# Patient Record
Sex: FEMALE | Race: BLACK OR AFRICAN AMERICAN | Employment: OTHER | URBAN - METROPOLITAN AREA
[De-identification: names, ages, dates, MRNs, and addresses within clinical notes are randomized per-mention and may not be internally consistent; named-entity substitution may affect disease eponyms.]

---

## 2017-01-18 ENCOUNTER — HOSPITAL ENCOUNTER (OUTPATIENT)
Dept: MAMMOGRAPHY | Age: 82
Discharge: HOME | End: 2017-01-18
Attending: FAMILY MEDICINE
Payer: MEDICARE

## 2017-01-18 DIAGNOSIS — Z12.31 VISIT FOR SCREENING MAMMOGRAM: ICD-10-CM

## 2017-01-18 PROCEDURE — 77067 SCR MAMMO BI INCL CAD: CPT

## 2017-03-10 ENCOUNTER — OFFICE VISIT (OUTPATIENT)
Dept: INTERNAL MEDICINE CLINIC | Age: 82
End: 2017-03-10

## 2017-03-10 VITALS
DIASTOLIC BLOOD PRESSURE: 60 MMHG | TEMPERATURE: 97.8 F | HEIGHT: 67 IN | BODY MASS INDEX: 25.39 KG/M2 | OXYGEN SATURATION: 100 % | SYSTOLIC BLOOD PRESSURE: 148 MMHG | WEIGHT: 161.8 LBS | HEART RATE: 53 BPM | RESPIRATION RATE: 16 BRPM

## 2017-03-10 DIAGNOSIS — I10 ESSENTIAL HYPERTENSION: Primary | ICD-10-CM

## 2017-03-10 DIAGNOSIS — R41.3 MEMORY CHANGES: ICD-10-CM

## 2017-03-10 DIAGNOSIS — R61 NIGHT SWEATS: ICD-10-CM

## 2017-03-10 DIAGNOSIS — G47.00 INSOMNIA, UNSPECIFIED TYPE: ICD-10-CM

## 2017-03-10 RX ORDER — ZOLPIDEM TARTRATE 5 MG/1
TABLET ORAL
Qty: 30 TAB | Refills: 1 | Status: SHIPPED | OUTPATIENT
Start: 2017-03-10 | End: 2017-07-12 | Stop reason: SDUPTHER

## 2017-03-10 NOTE — PROGRESS NOTES
SUBJECTIVE:   Ms. Rajat Norman is a 80 y.o. female who is here for follow up of htn. Pt's BP is slightly elevated at 151/92 and 148/60 upon recheck today. Pt reports her BP is normal at home. Pt endorses taking htn medication as prescribed. Pt denies cp, SOB, or BLE edema. Pt c/o insomnia. Pt denies napping or drinking caffeine. Pt reports trying OTC sleep aids without relief. Pt called her son Sonya Chawla in the office today. Pt's son states he is concerned about some memory loss that is progressing. Pt claims she is fine not remembering things and is not concerned. Pt claims her son is more concerned than she is. Pt notes she may slip any medications she gets prescribed for memory into his food. Pt reports using Estroven for almost complete relief of hot flashes. Pt endorses walking for exercise. At this time, she is otherwise doing well and has brought no other complaints to my attention today. For a list of the medical issues addressed today, see the assessment and plan below. PMH:   Past Medical History:   Diagnosis Date    Arthritis     inflammatory Evaline Lever); rheumatoid?  Bladder incontinence 1997    Contact dermatitis and other eczema, due to unspecified cause     rosacea; chronic urticaria; Dr. Latrice Koenig Hypertension     Neuropathy, peripheral ([facility] [address]     Perennial allergic rhinitis     chronic sinusitis     PSH:  has a past surgical history that includes urological; foot/toes surgery proc unlisted (1/2001); drea and bso (1970's); endoscopy, colon, diagnostic (2001); breast biopsy (Left, long ago); and hansa us bx breast lt vac asst (Left, 01/20/2016). All: is allergic to nsaids (non-steroidal anti-inflammatory drug). MEDS:   Current Outpatient Prescriptions   Medication Sig    zolpidem (AMBIEN) 5 mg tablet Take 1/2 tablet every evening.     amLODIPine (NORVASC) 2.5 mg tablet TAKE 1 TABLET BY MOUTH DAILY    atenolol-chlorthalidone (TENORETIC) 50-25 mg per tablet TAKE 1 TABLET BY MOUTH ONCE EVERY DAY    aspirin delayed-release 81 mg tablet Take 81 mg by mouth daily.  MULTIVIT &MINERALS/FERROUS FUM (MULTI VITAMIN PO) Take  by mouth. No current facility-administered medications for this visit. FH: family history includes Breast Cancer in her mother; Cancer in her sister; Cancer (age of onset: 79) in her mother; Diabetes in her brother; Other in her father. SH:  reports that she has never smoked. She has never used smokeless tobacco. She reports that she does not drink alcohol or use illicit drugs. Review of Systems - History obtained from the patient  General ROS: +changes in memory, +insomnia, otherwise negative  Psychological ROS: negative  Ophthalmic ROS: negative  ENT ROS: negative  Respiratory ROS: no cough, shortness of breath, or wheezing  Cardiovascular ROS: no chest pain or dyspnea on exertion  Gastrointestinal ROS: no abdominal pain, change in bowel habits, or black or bloody stools  Genito-Urinary ROS: negative  Musculoskeletal ROS: negative  Neurological ROS: negative  Dermatological ROS: negative    OBJECTIVE:   Vitals:   Visit Vitals    /60    Pulse (!) 53    Temp 97.8 °F (36.6 °C) (Oral)    Resp 16    Ht 5' 6.5\" (1.689 m)    Wt 161 lb 12.8 oz (73.4 kg)    LMP  (LMP Unknown)    SpO2 100%    BMI 25.72 kg/m2      Gen: Pleasant 80 y.o.  female in NAD. HEENT: NC/AT. HEART: RRR, No M/G/R. LUNGS: CTAB No W/R. EXTREMITIES: Warm. No C/C/E. NEURO: Alert and oriented x 3. Cranial nerves grossly intact. No focal sensory or motor deficits noted. SKIN: Warm. Dry. No rashes or other lesions noted. ASSESSMENT/ PLAN:     Belinda Reid was seen today for hypertension. Diagnoses and all orders for this visit:    Essential hypertension  -     METABOLIC PANEL, COMPREHENSIVE    Night sweats    Memory changes  -     REFERRAL TO NEUROPSYCHOLOGY    Insomnia, unspecified type  -     zolpidem (AMBIEN) 5 mg tablet;  Take 1/2 tablet every evening. This patient's blood pressure is stable and controlled on the current medication. Continue the current regimen. Patient has white coat hypertension. Pt was referred to Dr. Fiona Mallory (neuropsychology) for changes in memory. I recommended the pt learn a new language or new tasks to keep her mind sharp. I ordered a CMP lab for monitoring of medication. Pt may have this lab done another day. I prescribed Ambien 5mg 1/2 tablet nightly for management of insomnia. Pt will f/u in 6 months for htn. Follow-up Disposition:  Return in about 6 months (around 9/10/2017) for follow up htn. I have reviewed the patient's medications and risks/side effects/benefits were discussed. Diagnosis(-es) explained to patient and questions answered. Literature provided where appropriate.      Written by Xavier Frias, as dictated by Alanna Peng MD.

## 2017-03-10 NOTE — PROGRESS NOTES
Reviewed record in preparation for visit and have obtained necessary documentation. Identified pt with two pt identifiers(name and ). Chief Complaint   Patient presents with    Hypertension     pt is here for a f/u       Health Maintenance Due   Topic Date Due    ZOSTER VACCINE AGE 60>  1995    Pneumococcal 65+ Low/Medium Risk (2 of 2 - PPSV23) 2011    DTaP/Tdap/Td series (1 - Tdap) 2011    MEDICARE YEARLY EXAM  04/15/2016    INFLUENZA AGE 9 TO ADULT  2016       Coordination of Care Questionnaire:  :     1. Have you been to the ER, urgent care clinic since your last visit? Hospitalized since your last visit?no    2. Have you seen or consulted any other health care providers outside of the [address] [place] [address] since your last visit? Include any pap smears or colon screening.  no

## 2017-03-10 NOTE — MR AVS SNAPSHOT
Visit Information Date & Time Provider Department Dept. Phone Encounter #  
 3/10/2017 11:30 AM Will Galicia, [address] [id number] Follow-up Instructions Return in about 6 months (around 9/10/2017) for follow up htn. Upcoming Health Maintenance Date Due ZOSTER VACCINE AGE 60> 4/4/1995 Pneumococcal 65+ Low/Medium Risk (2 of 2 - PPSV23) 6/1/2011 DTaP/Tdap/Td series (1 - Tdap) 7/2/2011 MEDICARE YEARLY EXAM 4/15/2016 INFLUENZA AGE 9 TO ADULT 8/1/2016 GLAUCOMA SCREENING Q2Y 2/10/2018 Allergies as of 3/10/2017  Review Complete On: 3/10/2017 By: Will Galicia MD  
  
 Severity Noted Reaction Type Reactions Nsaids (Non-steroidal Anti-inflammatory Drug)  11/10/2011    Unknown (comments) GI upset Current Immunizations  Reviewed on 9/18/2013 Name Date Influenza High Dose Vaccine PF 11/15/2016 Influenza Vaccine 10/14/2015 10:41 AM, 10/1/2014, 11/1/2013 Influenza Vaccine Split 9/11/2012 Influenza Vaccine Whole 12/14/2011 Pneumococcal Vaccine (Unspecified Type) 6/1/2006 TD Vaccine 7/1/2011 Not reviewed this visit You Were Diagnosed With   
  
 Codes Comments Essential hypertension    -  Primary ICD-10-CM: I10 
ICD-9-CM: 401.9 Night sweats     ICD-10-CM: R61 
ICD-9-CM: 780.8 Memory changes     ICD-10-CM: R41.3 ICD-9-CM: 780.93 Insomnia, unspecified type     ICD-10-CM: G47.00 ICD-9-CM: 780.52 Vitals BP Pulse Temp Resp Height(growth percentile) Weight(growth percentile) 148/60 (!) 53 97.8 °F (36.6 °C) (Oral) 16 5' 6.5\" (1.689 m) 161 lb 12.8 oz (73.4 kg) LMP SpO2 BMI OB Status Smoking Status (LMP Unknown) 100% 25.72 kg/m2 Hysterectomy Never Smoker Vitals History BMI and BSA Data Body Mass Index Body Surface Area 25.72 kg/m 2 1.86 m 2 Preferred Pharmacy Pharmacy Name Phone  [address] [phone] Your Updated Medication List  
  
   
This list is accurate as of: 3/10/17 12:43 PM.  Always use your most recent med list. amLODIPine 2.5 mg tablet Commonly known as:  Crawford Mend TAKE 1 TABLET BY MOUTH DAILY  
  
 aspirin delayed-release 81 mg tablet Take 81 mg by mouth daily. atenolol-chlorthalidone 50-25 mg per tablet Commonly known as:  TENORETIC  
TAKE 1 TABLET BY MOUTH ONCE EVERY DAY  
  
 MULTI VITAMIN PO Take  by mouth.  
  
 zolpidem 5 mg tablet Commonly known as:  AMBIEN Take 1/2 tablet every evening. Prescriptions Printed Refills  
 zolpidem (AMBIEN) 5 mg tablet 1 Sig: Take 1/2 tablet every evening. Class: Print We Performed the Following METABOLIC PANEL, COMPREHENSIVE [90936 CPT(R)] REFERRAL TO NEUROPSYCHOLOGY [ZGP54 Custom] Comments:  
 Please evaluate patient for memory changes. Follow-up Instructions Return in about 6 months (around 9/10/2017) for follow up htn. Referral Information Referral ID Referred By Referred To  
  
 [id number] [place], [address] [facility] [id number] [place], [address], [facility] Visits Status Start Date End Date 1 New Request 3/10/17 3/10/18 If your referral has a status of pending review or denied, additional information will be sent to support the outcome of this decision. Introducing [facility]! Dear Claudia Bryant: Thank you for requesting a Atzip account. Our records indicate that you already have an active Atzip account. You can access your account anytime at [email or web address] Did you know that you can access your hospital and ER discharge instructions at any time in Atzip? You can also review all of your test results from your hospital stay or ER visit. Additional Information If you have questions, please visit the Frequently Asked Questions section of the OnShift website at [email or web address] Remember, OnShift is NOT to be used for urgent needs. For medical emergencies, dial 911. Now available from your iPhone and Android! Please provide this summary of care documentation to your next provider. Your primary care clinician is listed as Alanna Peng. If you have any questions after today's visit, please call [phone].

## 2017-03-11 LAB
ALBUMIN SERPL-MCNC: 4.2 G/DL (ref 3.5–4.7)
ALBUMIN/GLOB SERPL: 1.4 {RATIO} (ref 1.1–2.5)
ALP SERPL-CCNC: 43 IU/L (ref 39–117)
ALT SERPL-CCNC: 12 IU/L (ref 0–32)
AST SERPL-CCNC: 18 IU/L (ref 0–40)
BILIRUB SERPL-MCNC: 0.4 MG/DL (ref 0–1.2)
BUN SERPL-MCNC: 12 MG/DL (ref 8–27)
BUN/CREAT SERPL: 23 (ref 11–26)
CALCIUM SERPL-MCNC: 10.3 MG/DL (ref 8.7–10.3)
CHLORIDE SERPL-SCNC: 98 MMOL/L (ref 96–106)
CO2 SERPL-SCNC: 29 MMOL/L (ref 18–29)
CREAT SERPL-MCNC: 0.53 MG/DL (ref 0.57–1)
GLOBULIN SER CALC-MCNC: 3.1 G/DL (ref 1.5–4.5)
GLUCOSE SERPL-MCNC: 88 MG/DL (ref 65–99)
POTASSIUM SERPL-SCNC: 4.1 MMOL/L (ref 3.5–5.2)
PROT SERPL-MCNC: 7.3 G/DL (ref 6–8.5)
SODIUM SERPL-SCNC: 142 MMOL/L (ref 134–144)

## 2017-05-09 DIAGNOSIS — I10 ESSENTIAL HYPERTENSION: ICD-10-CM

## 2017-05-10 RX ORDER — AMLODIPINE BESYLATE 2.5 MG/1
TABLET ORAL
Qty: 90 TAB | Refills: 3 | Status: SHIPPED | OUTPATIENT
Start: 2017-05-10 | End: 2018-04-10 | Stop reason: SDUPTHER

## 2017-05-10 RX ORDER — ATENOLOL AND CHLORTHALIDONE TABLET 50; 25 MG/1; MG/1
TABLET ORAL
Qty: 90 TAB | Refills: 3 | Status: SHIPPED | OUTPATIENT
Start: 2017-05-10 | End: 2018-04-30 | Stop reason: SDUPTHER

## 2017-07-12 DIAGNOSIS — G47.00 INSOMNIA, UNSPECIFIED TYPE: ICD-10-CM

## 2017-07-12 RX ORDER — ZOLPIDEM TARTRATE 5 MG/1
TABLET ORAL
Qty: 30 TAB | Refills: 1 | Status: SHIPPED | OUTPATIENT
Start: 2017-07-12 | End: 2018-04-11

## 2017-07-31 ENCOUNTER — TELEPHONE (OUTPATIENT)
Dept: INTERNAL MEDICINE CLINIC | Age: 82
End: 2017-07-31

## 2017-07-31 NOTE — TELEPHONE ENCOUNTER
----- Message from Antionette Booker sent at 7/31/2017 11:11 AM EDT -----  Regarding: Lien/telephone  Pt is requesting an appointment for her medicare wellness. Pts number is [phone].       Copied/pasted from [facility]

## 2017-08-30 ENCOUNTER — OFFICE VISIT (OUTPATIENT)
Dept: INTERNAL MEDICINE CLINIC | Age: 82
End: 2017-08-30

## 2017-08-30 ENCOUNTER — HOSPITAL ENCOUNTER (OUTPATIENT)
Dept: LAB | Age: 82
Discharge: HOME | End: 2017-08-30
Payer: MEDICARE

## 2017-08-30 VITALS
RESPIRATION RATE: 16 BRPM | WEIGHT: 164.8 LBS | HEART RATE: 59 BPM | BODY MASS INDEX: 25.87 KG/M2 | HEIGHT: 67 IN | OXYGEN SATURATION: 96 % | TEMPERATURE: 97.9 F | DIASTOLIC BLOOD PRESSURE: 53 MMHG | SYSTOLIC BLOOD PRESSURE: 137 MMHG

## 2017-08-30 DIAGNOSIS — M94.9 DISORDER OF BONE AND CARTILAGE, UNSPECIFIED: ICD-10-CM

## 2017-08-30 DIAGNOSIS — Z00.00 MEDICARE ANNUAL WELLNESS VISIT, SUBSEQUENT: Primary | ICD-10-CM

## 2017-08-30 DIAGNOSIS — Z13.31 SCREENING FOR DEPRESSION: ICD-10-CM

## 2017-08-30 DIAGNOSIS — M89.9 DISORDER OF BONE AND CARTILAGE, UNSPECIFIED: ICD-10-CM

## 2017-08-30 DIAGNOSIS — Z13.39 SCREENING FOR ALCOHOLISM: ICD-10-CM

## 2017-08-30 DIAGNOSIS — Z23 ENCOUNTER FOR IMMUNIZATION: ICD-10-CM

## 2017-08-30 PROCEDURE — 36415 COLL VENOUS BLD VENIPUNCTURE: CPT

## 2017-08-30 PROCEDURE — 80061 LIPID PANEL: CPT

## 2017-08-30 NOTE — PROGRESS NOTES
Chief Complaint   Patient presents with    Annual Wellness Visit    Immunization/Injection     1. Have you been to the ER, urgent care clinic since your last visit? Hospitalized since your last visit? No    2. Have you seen or consulted any other health care providers outside of the [address] [place] [address] since your last visit? Include any pap smears or colon screening. No    In the event something were to happen to you and you were unable to speak on your behalf, do you have an Advance Directive/ Living Will in place stating your wishes? No    If yes, do we have a copy on file: N/A    If no, would you like information: Yes, Information provided and all questions answered to patient's satisfaction. Reviewed record In preparation for visit and have obtained necessary documentation.

## 2017-08-30 NOTE — PATIENT INSTRUCTIONS
Vaccine Information Statement     Pneumococcal Conjugate Vaccine (PCV13): What You Need to Know    Many Vaccine Information Statements are available in Frisian and other languages. See www.immunize.org/vis. Hojas de información Sobre Vacunas están disponibles en español y en muchos otros idiomas. Visite www.immunize.org/vis. 1. Why get vaccinated? Vaccination can protect both children and adults from pneumococcal disease. Pneumococcal disease is caused by bacteria that can spread from person to person through close contact. It can cause ear infections, and it can also lead to more serious infections of the:   Lungs (pneumonia),   Blood (bacteremia), and   Covering of the brain and spinal cord (meningitis). Pneumococcal pneumonia is most common among adults. Pneumococcal meningitis can cause deafness and brain damage, and it kills about 1 child in 10 who get it. Anyone can get pneumococcal disease, but children under 3years of age and adults 72 years and older, people with certain medical conditions, and cigarette smokers are at the highest risk. Before there was a vaccine, the [facility] saw:   more than 700 cases of meningitis,   about 13,000 blood infections,   about 5 million ear infections, and   about 200 deaths  in children under 5 each year from pneumococcal disease. Since vaccine became available, severe pneumococcal disease in these children has fallen by 88%. About 18,000 older adults die of pneumococcal disease each year in the [place]. Treatment of pneumococcal infections with penicillin and other drugs is not as effective as it used to be, because some strains of the disease have become resistant to these drugs. This makes prevention of the disease, through vaccination, even more important. 2. PCV13 vaccine    Pneumococcal conjugate vaccine (called PCV13) protects against 13 types of pneumococcal bacteria.       PCV13 is routinely given to children at 2, 4, 6, and 1515 months of age. It is also recommended for children and adults 3to 59years of age with certain health conditions, and for all adults 72years of age and older. Your doctor can give you details. 3. Some people should not get this vaccine    Anyone who has ever had a life-threatening allergic reaction to a dose of this vaccine, to an earlier pneumococcal vaccine called PCV7, or to any vaccine containing diphtheria toxoid (for example, DTaP), should not get PCV13. Anyone with a severe allergy to any component of PCV13 should not get the vaccine. Tell your doctor if the person being vaccinated has any severe allergies. If the person scheduled for vaccination is not feeling well, your healthcare provider might decide to reschedule the shot on another day. 4. Risks of a vaccine reaction    With any medicine, including vaccines, there is a chance of reactions. These are usually mild and go away on their own, but serious reactions are also possible. Problems reported following PCV13 varied by age and dose in the series. The most common problems reported among children were:    About half became drowsy after the shot, had a temporary loss of appetite, or had redness or tenderness where the shot was given.  About 1 out of 3 had swelling where the shot was given.  About 1 out of 3 had a mild fever, and about 1 in 20 had a fever over 102.2°F.   Up to about 8 out of 10 became fussy or irritable. Adults have reported pain, redness, and swelling where the shot was given; also mild fever, fatigue, headache, chills, or muscle pain. [employer] children who get PCV13 along with inactivated flu vaccine at the same time may be at increased risk for seizures caused by fever. Ask your doctor for more information. Problems that could happen after any vaccine:     People sometimes faint after a medical procedure, including vaccination.  Sitting or lying down for about 15 minutes can help prevent fainting, and injuries caused by a fall. Tell your doctor if you feel dizzy, or have vision changes or ringing in the ears.  Some older children and adults get severe pain in the shoulder and have difficulty moving the arm where a shot was given. This happens very rarely.  Any medication can cause a severe allergic reaction. Such reactions from a vaccine are very rare, estimated at about 1 in a million doses, and would happen within a few minutes to a few hours after the vaccination. As with any medicine, there is a very small chance of a vaccine causing a serious injury or death. The safety of vaccines is always being monitored. For more information, visit: www.cdc.gov/vaccinesafety/     5. What if there is a serious reaction? What should I look for?  Look for anything that concerns you, such as signs of a severe allergic reaction, very high fever, or unusual behavior. Signs of a severe allergic reaction can include hives, swelling of the face and throat, difficulty breathing, a fast heartbeat, dizziness, and weakness  usually within a few minutes to a few hours after the vaccination. What should I do?  If you think it is a severe allergic reaction or other emergency that cant wait, call 9-1-1 or get the person to the nearest hospital. Otherwise, call your doctor. Reactions should be reported to the Vaccine Adverse Event Reporting System (VAERS). Your doctor should file this report, or you can do it yourself through the VAERS web site at www.vaers. hhs.gov, or by calling 5-385.608.5301. VAERS does not give medical advice. 6. The National Vaccine Injury Compensation Program    The [facility] Vaccine Injury Compensation Program (VICP) is a federal program that was created to compensate people who may have been injured by certain vaccines.     Persons who believe they may have been injured by a vaccine can learn about the program and about filing a claim by calling 1-773.862.1780 or visiting the [employer] website at www.[facility] Facility[facility].gov/vaccinecompensation. There is a time limit to file a claim for compensation. 7. How can I learn more?  Ask your healthcare provider. He or she can give you the vaccine package insert or suggest other sources of information.  Call your local or state health department.  Contact the Centers for Disease Control and Prevention (CDC):  - Call 9-192.859.8398 (9-641-KUN-INFO) or  - Visit CDCs website at www.cdc.gov/vaccines    Vaccine Information Statement   PCV13 Vaccine   11/5/2015   [facility] [id number]    Department of Health and Human Services  Centers for Disease Control and Prevention    Office Use Only    Medicare Wellness Visit, Female    The best way to live healthy is to have a healthy lifestyle by eating a well-balanced diet, exercising regularly, limiting alcohol and stopping smoking. Regular physical exams and screening tests are another way to keep healthy. Preventive exams provided by your health care provider can find health problems before they become diseases or illnesses. Preventive services including immunizations, screening tests, monitoring and exams can help you take care of your own health. All people over age 72 should have a pneumovax  and and a prevnar shot to prevent pneumonia. These are once in a lifetime unless you and your provider decide differently. All people over 65 should have a yearly flu shot and a tetanus vaccine every 10 years. A bone mass density to screen for osteoporosis or thinning of the bones should be done every 2 years after 65. Screening for diabetes mellitus with a blood sugar test should be done every year.     Glaucoma is a disease of the eye due to increased ocular pressure that can lead to blindness and it should be done every year by an eye professional.    Cardiovascular screening tests that check for elevated lipids (fatty part of blood) which can lead to heart disease and strokes should be done every 5 years. Colorectal screening that evaluates for blood or polyps in your colon should be done yearly as a stool test or every five years as a flexible sigmoidoscope or every 10 years as a colonoscopy up to age 76. Breast cancer screening with a mammogram is recommended biennially  for women age 54-69. Screening for cervical cancer with a pap smear and pelvic exam is recommended for women after age 72 years every 2 years up to age 79 or when the provider and patient decide to stop. If there is a history of cervical abnormalities or other increased risk for cancer then the test is recommended yearly. Hepatitis C screening is also recommended for anyone born between [birth year] through [address]. A shingles vaccine is also recommended once in a lifetime after age 61. Your Medicare Wellness Exam is recommended annually.     Here is a list of your current Health Maintenance items with a due date:  Health Maintenance Due   Topic Date Due    Shingles Vaccine  02/04/1995    Pneumococcal Vaccine (2 of 2 - PPSV23) 06/01/2011    DTaP/Tdap/Td  (1 - Tdap) 07/02/2011    Annual Well Visit  04/15/2016    Flu Vaccine  08/01/2017

## 2017-08-30 NOTE — MR AVS SNAPSHOT
Visit Information Date & Time Provider Department Dept. Phone Encounter #  
 8/30/2017  8:45 AM Mamta Mortensen, [address] [place] [address],[address] [phone] [id number] Follow-up Instructions Return in about 1 year (around 8/30/2018) for annual wellness visit. Upcoming Health Maintenance Date Due ZOSTER VACCINE AGE 60> 2/4/1995 Pneumococcal 65+ Low/Medium Risk (2 of 2 - PPSV23) 6/1/2011 DTaP/Tdap/Td series (1 - Tdap) 7/2/2011 MEDICARE YEARLY EXAM 4/15/2016 INFLUENZA AGE 9 TO ADULT 8/1/2017 GLAUCOMA SCREENING Q2Y 2/10/2018 Allergies as of 8/30/2017  Review Complete On: 8/30/2017 By: Digna Macdonald LPN Severity Noted Reaction Type Reactions Nsaids (Non-steroidal Anti-inflammatory Drug)  11/10/2011    Unknown (comments) GI upset Current Immunizations  Reviewed on 8/30/2017 Name Date Influenza High Dose Vaccine PF 11/15/2016 Influenza Vaccine 10/14/2015 10:41 AM, 10/1/2014, 11/1/2013 Influenza Vaccine Split 9/11/2012 Influenza Vaccine Whole 12/14/2011 Pneumococcal Conjugate (PCV-13) 8/30/2017  9:20 AM  
 TD Vaccine 7/1/2011 ZZZ-RETIRED (DO NOT USE) Pneumococcal Vaccine (Unspecified Type) 6/1/2006 Reviewed by Digna Macdonald LPN on 4/15/5781 at  9:20 AM  
You Were Diagnosed With   
  
 Codes Comments Medicare annual wellness visit, subsequent    -  Primary ICD-10-CM: Z00.00 ICD-9-CM: V70.0 Encounter for immunization     ICD-10-CM: I70 ICD-9-CM: V03.89 Screening for alcoholism     ICD-10-CM: Z13.89 ICD-9-CM: V79.1 Screening for depression     ICD-10-CM: Z13.89 ICD-9-CM: V79.0 Disorder of bone and cartilage, unspecified     ICD-10-CM: M89.9, M94.9 ICD-9-CM: 733.90 Vitals BP Pulse Temp Resp Height(growth percentile) Weight(growth percentile) 137/53 (BP 1 Location: Left arm, BP Patient Position: Sitting) (!) 59 97.9 °F (36.6 °C) (Oral) 16 5' 6.5\" (1.689 m) 164 lb 12.8 oz (74.8 kg) LMP SpO2 BMI OB Status Smoking Status (LMP Unknown) 96% 26.2 kg/m2 Hysterectomy Never Smoker Vitals History BMI and BSA Data Body Mass Index Body Surface Area  
 26.2 kg/m 2 1.87 m 2 Preferred Pharmacy Pharmacy Name Phone Stephanie Franco [place] [id number] [facility] [address] [phone] Your Updated Medication List  
  
   
This list is accurate as of: 17  9:20 AM.  Always use your most recent med list. amLODIPine 2.5 mg tablet Commonly known as:  Zach Lute TAKE 1 TABLET BY MOUTH DAILY  
  
 aspirin delayed-release 81 mg tablet Take 81 mg by mouth daily. atenolol-chlorthalidone 50-25 mg per tablet Commonly known as:  TENORETIC  
TAKE 1 TABLET BY MOUTH ONCE EVERY DAY  
  
 diph,pertuss(acel),tetanus vac(PF) 2 Lf-(2.5-5-3-5 mcg)-5Lf/0.5 mL Syrg vaccine Commonly known as:  ADACEL  
0.5 mL by IntraMUSCular route once for 1 dose. MULTI VITAMIN PO Take  by mouth.  
  
 varicella zoster vacine live 19,400 unit/0.65 mL Susr injection Commonly known as:  ZOSTAVAX  
1 Vial by SubCUTAneous route once for 1 dose. zolpidem 5 mg tablet Commonly known as:  AMBIEN Take 1/2 tablet every evening. Prescriptions Printed Refills diph,pertuss,acel,,tetanus vac,PF, (ADACEL) 2 Lf-(2.5-5-3-5 mcg)-5Lf/0.5 mL syrg vaccine 0 Si.5 mL by IntraMUSCular route once for 1 dose. Class: Print Route: IntraMUSCular  
 varicella zoster vacine live (ZOSTAVAX) 19,400 unit/0.65 mL susr injection 0 Si Vial by SubCUTAneous route once for 1 dose. Class: Print Route: SubCUTAneous We Performed the Following ADMIN PNEUMOCOCCAL VACCINE [ HCP] Minnie 68 [[id number] [facility]] PNEUMOCOCCAL CONJ VACCINE 13 VALENT IM [id number] CPT(R)] IN ANNUAL ALCOHOL SCREEN 15 MIN [id number] [facility]] Follow-up Instructions Return in about 1 year (around 8/30/2018) for annual wellness visit. To-Do List   
 09/02/2017 Imaging:  DEXA BONE DENSITY STUDY AXIAL Patient Instructions Vaccine Information Statement Pneumococcal Conjugate Vaccine (PCV13): What You Need to Know Many Vaccine Information Statements are available in Guamanian and other languages. See www.immunize.org/vis. Hojas de información Sobre Vacunas están disponibles en español y en muchos otros idiomas. Visite www.immunize.org/vis. 1. Why get vaccinated? Vaccination can protect both children and adults from pneumococcal disease. Pneumococcal disease is caused by bacteria that can spread from person to person through close contact. It can cause ear infections, and it can also lead to more serious infections of the: 
 Lungs (pneumonia),  Blood (bacteremia), and 
 Covering of the brain and spinal cord (meningitis). Pneumococcal pneumonia is most common among adults. Pneumococcal meningitis can cause deafness and brain damage, and it kills about 1 child in 10 who get it. Anyone can get pneumococcal disease, but children under 3years of age and adults 72 years and older, people with certain medical conditions, and cigarette smokers are at the highest risk. Before there was a vaccine, the [facility] saw: 
 more than 700 cases of meningitis, 
 about 13,000 blood infections, 
 about 5 million ear infections, and 
 about 200 deaths 
in children under 5 each year from pneumococcal disease. Since vaccine became available, severe pneumococcal disease in these children has fallen by 88%. About 18,000 older adults die of pneumococcal disease each year in the [place]. Treatment of pneumococcal infections with penicillin and other drugs is not as effective as it used to be, because some strains of the disease have become resistant to these drugs.  This makes prevention of the disease, through vaccination, even more important. 2. PCV13 vaccine Pneumococcal conjugate vaccine (called PCV13) protects against 13 types of pneumococcal bacteria. PCV13 is routinely given to children at 2, 4, 6, and 1515 months of age. It is also recommended for children and adults 3to 59years of age with certain health conditions, and for all adults 72years of age and older. Your doctor can give you details. 3. Some people should not get this vaccine Anyone who has ever had a life-threatening allergic reaction to a dose of this vaccine, to an earlier pneumococcal vaccine called PCV7, or to any vaccine containing diphtheria toxoid (for example, DTaP), should not get PCV13. Anyone with a severe allergy to any component of PCV13 should not get the vaccine. Tell your doctor if the person being vaccinated has any severe allergies. If the person scheduled for vaccination is not feeling well, your healthcare provider might decide to reschedule the shot on another day. 4. Risks of a vaccine reaction With any medicine, including vaccines, there is a chance of reactions. These are usually mild and go away on their own, but serious reactions are also possible. Problems reported following PCV13 varied by age and dose in the series. The most common problems reported among children were:  About half became drowsy after the shot, had a temporary loss of appetite, or had redness or tenderness where the shot was given.  About 1 out of 3 had swelling where the shot was given.  About 1 out of 3 had a mild fever, and about 1 in 20 had a fever over 102.2°F. 
 Up to about 8 out of 10 became fussy or irritable. Adults have reported pain, redness, and swelling where the shot was given; also mild fever, fatigue, headache, chills, or muscle pain.  
 
Young children who get PCV13 along with inactivated flu vaccine at the same time may be at increased risk for seizures caused by fever. Ask your doctor for more information. Problems that could happen after any vaccine:  People sometimes faint after a medical procedure, including vaccination. Sitting or lying down for about 15 minutes can help prevent fainting, and injuries caused by a fall. Tell your doctor if you feel dizzy, or have vision changes or ringing in the ears.  Some older children and adults get severe pain in the shoulder and have difficulty moving the arm where a shot was given. This happens very rarely.  Any medication can cause a severe allergic reaction. Such reactions from a vaccine are very rare, estimated at about 1 in a million doses, and would happen within a few minutes to a few hours after the vaccination. As with any medicine, there is a very small chance of a vaccine causing a serious injury or death. The safety of vaccines is always being monitored. For more information, visit: www.cdc.gov/vaccinesafety/  
 
5. What if there is a serious reaction? What should I look for?  Look for anything that concerns you, such as signs of a severe allergic reaction, very high fever, or unusual behavior. Signs of a severe allergic reaction can include hives, swelling of the face and throat, difficulty breathing, a fast heartbeat, dizziness, and weakness  usually within a few minutes to a few hours after the vaccination. What should I do?  If you think it is a severe allergic reaction or other emergency that cant wait, call 9-1-1 or get the person to the nearest hospital. Otherwise, call your doctor. Reactions should be reported to the Vaccine Adverse Event Reporting System (VAERS). Your doctor should file this report, or you can do it yourself through the VAERS web site at www.vaers. hhs.gov, or by calling 5-792.751.2077. VAERS does not give medical advice.  
 
6. The National Vaccine Injury [employer] 
 
 The [place] Injury Compensation Program (VICP) is a federal program that was created to compensate people who may have been injured by certain vaccines. Persons who believe they may have been injured by a vaccine can learn about the program and about filing a claim by calling 5-399.827.1310 or visiting the [address] website at www.[facility].gov/vaccinecompensation. There is a time limit to file a claim for compensation. 7. How can I learn more?  Ask your healthcare provider. He or she can give you the vaccine package insert or suggest other sources of information.  Call your local or state health department.  Contact the Centers for Disease Control and Prevention (CDC): 
- Call 8-853.510.5204 (4-590-BKX-INFO) or 
- Visit CDCs website at www.cdc.gov/vaccines Vaccine Information Statement PCV13 Vaccine 11/5/2015  
[address] Avers 552NF-46 Department of Health and Go[employer] Centers for Disease Control and Prevention Office Use Only Medicare Wellness Visit, Female The best way to live healthy is to have a healthy lifestyle by eating a well-balanced diet, exercising regularly, limiting alcohol and stopping smoking. Regular physical exams and screening tests are another way to keep healthy. Preventive exams provided by your health care provider can find health problems before they become diseases or illnesses. Preventive services including immunizations, screening tests, monitoring and exams can help you take care of your own health. All people over age 72 should have a pneumovax  and and a prevnar shot to prevent pneumonia. These are once in a lifetime unless you and your provider decide differently. All people over 65 should have a yearly flu shot and a tetanus vaccine every 10 years. A bone mass density to screen for osteoporosis or thinning of the bones should be done every 2 years after 65.  
 
Screening for diabetes mellitus with a blood sugar test should be done every year. Glaucoma is a disease of the eye due to increased ocular pressure that can lead to blindness and it should be done every year by an eye professional. 
 
Cardiovascular screening tests that check for elevated lipids (fatty part of blood) which can lead to heart disease and strokes should be done every 5 years. Colorectal screening that evaluates for blood or polyps in your colon should be done yearly as a stool test or every five years as a flexible sigmoidoscope or every 10 years as a colonoscopy up to age 76. Breast cancer screening with a mammogram is recommended biennially  for women age 54-69. Screening for cervical cancer with a pap smear and pelvic exam is recommended for women after age 72 years every 2 years up to age 79 or when the provider and patient decide to stop. If there is a history of cervical abnormalities or other increased risk for cancer then the test is recommended yearly. Hepatitis C screening is also recommended for anyone born between 80 through [address]. A shingles vaccine is also recommended once in a lifetime after age 61. Your Medicare Wellness Exam is recommended annually. Here is a list of your current Health Maintenance items with a due date: 
Health Maintenance Due Topic Date Due  Shingles Vaccine  02/04/1995  Pneumococcal Vaccine (2 of 2 - PPSV23) 06/01/2011  
 DTaP/Tdap/Td  (1 - Tdap) 07/02/2011 [facility] Annual Well Visit  04/15/2016  Flu Vaccine  08/01/2017 Introducing [facility]! Dear Aaliyah: Thank you for requesting a [employer] account. Our records indicate that you already have an active [employer] account. You can access your account anytime at [email or web address] Did you know that you can access your hospital and ER discharge instructions at any time in [employer]? You can also review all of your test results from your hospital stay or ER visit. Additional Information If you have questions, please visit the Frequently Asked Questions section of the [employer] website at [email or web address] Remember, [employer] is NOT to be used for urgent needs. For medical emergencies, dial 911. Now available from your iPhone and Android! Please provide this summary of care documentation to your next provider. Your primary care clinician is listed as Nickie Bolivar. If you have any questions after today's visit, please call [phone].

## 2017-08-30 NOTE — PROGRESS NOTES
This is a Subsequent Medicare Annual Wellness Exam (AWV) (Performed 12 months after IPPE or effective date of Medicare Part B enrollment, Once in a lifetime)    I have reviewed the patient's medical history in detail and updated the computerized patient record. History     Past Medical History:   Diagnosis Date    Arthritis     inflammatory Michael Jock); rheumatoid?  Bladder incontinence 1997    Contact dermatitis and other eczema, due to unspecified cause     rosacea; chronic urticaria; Dr. Will Aguilera Hypertension     Neuropathy, peripheral ([address]     Perennial allergic rhinitis     chronic sinusitis      Past Surgical History:   Procedure Laterality Date    ENDOSCOPY, COLON, DIAGNOSTIC  2001    negative    FOOT/TOES SURGERY PROC UNLISTED  1/2001    hammer toe surg.  HX BREAST BIOPSY Left long ago    Surgical bx--benign    HX DARYA AND BSO  1970's    endometriosis    HX UROLOGICAL      s/p bladder procedure(s)    [facility] BX BREAST LT VAC ASST Left 01/20/2016    Benign     Current Outpatient Prescriptions   Medication Sig Dispense Refill    zolpidem (AMBIEN) 5 mg tablet Take 1/2 tablet every evening. 30 Tab 1    amLODIPine (NORVASC) 2.5 mg tablet TAKE 1 TABLET BY MOUTH DAILY 90 Tab 3    atenolol-chlorthalidone (TENORETIC) 50-25 mg per tablet TAKE 1 TABLET BY MOUTH ONCE EVERY DAY 90 Tab 3    aspirin delayed-release 81 mg tablet Take 81 mg by mouth daily.  MULTIVIT &MINERALS/FERROUS FUM (MULTI VITAMIN PO) Take  by mouth.        Allergies   Allergen Reactions    Nsaids (Non-Steroidal Anti-Inflammatory Drug) Unknown (comments)     GI upset     Family History   Problem Relation Age of Onset    Cancer Mother 79     breast    Breast Cancer Mother      mother was in her 62s    Other Father      PVD    Cancer Sister      uterine, metastatic    Diabetes Brother      Social History   Substance Use Topics    Smoking status: Never Smoker    Smokeless tobacco: Never Used    Alcohol use No Patient Active Problem List   Diagnosis Code    HTN (hypertension) I10    Osteoarthritis M19.90    S/P DARYA-BSO Z90.710, Z90.722, Z90.79    Inflammatory arthritis M19.90    Hypertension I10    Neurop[address], peripheral [address] [address] G62.9    History of colonoscopy Z98.890    Insomnia G47.00    Hx of mammogram Z92.89    Dense breasts R92.2    Osteopenia M85.80       Depression Risk Factor Screening:     PHQ over the last two weeks 8/30/2017   Little interest or pleasure in doing things Not at all   Feeling down, depressed or hopeless Not at all   Total Score PHQ 2 0     Alcohol Risk Factor Screening: You do not drink alcohol or very rarely. Functional Ability and Level of Safety:   Hearing Loss  Hearing is good. Activities of Daily Living  The home contains: no safety equipment  Patient does total self care    Fall RiskFall Risk Assessment, last 12 mths 8/30/2017   Able to walk? Yes   Fall in past 12 months? No       Abuse Screen  Patient is not abused    Cognitive Screening   Evaluation of Cognitive Function:  Has your family/caregiver stated any concerns about your memory: no  Normal    Patient Care Team   Patient Care Team:  Carlos Cardona MD as PCP - General (Family Practice)  Kasandra Vincent (Rheumatology)  Ashu Anglin    Assessment/Plan   Education and counseling provided:  Are appropriate based on today's review and evaluation    Health Maintenance Due   Topic Date Due    ZOSTER VACCINE AGE 60>  02/04/1995    Pneumococcal 65+ Low/Medium Risk (2 of 2 - PPSV23) 06/01/2011    DTaP/Tdap/Td series (1 - Tdap) 07/02/2011    MEDICARE YEARLY EXAM  04/15/2016    INFLUENZA AGE 9 TO ADULT  08/01/2017       Encounter Diagnoses   Name Primary?  Routine general medical examination at a health care facility Yes    Screening for depression        1. ADL's and support. Patient lives independently at home. Lives in a housse.   States that she has friends and family nearby for support when needed. ADL Assessment 8/1/2016   Feeding yourself No Help Needed   Getting from bed to chair No Help Needed   Getting dressed No Help Needed   Bathing or showering No Help Needed   Walk across the room (includes cane/walker) No Help Needed   Using the telphone No Help Needed   Taking your medications No Help Needed   Preparing meals No Help Needed   Managing money (expenses/bills) No Help Needed   Moderately strenuous housework (laundry) No Help Needed   Shopping for personal items (toiletries/medicines) No Help Needed   Shopping for groceries No Help Needed   Driving No Help Needed   Climbing a flight of stairs No Help Needed   Getting to places beyond walking distances No Help Needed      2.  Cardiovascular blood tests. Last lipid panel  ordered today  3. Colorectal cancer screening. Not interested in follow up at this time      4. Diabetes screening tests. N/A     5.  Glaucoma screenings. Patient plans to schedule appointment this fall.      6. Flu vaccine. Last done fall 2016. She will return later in the fall for her 2017 flu shot.     7. Pneumonia vaccine. Prevnar 13 given today.     8.  Prostate -N/A     9. Advance care planning- ACP planning begun today, information packet given to patient. She agrees to meet with NN for further discussion of ACP     10. Bone density. DEXA last done in 2014 and a repeat scan was ordered today.       11. Mammography. She states that she will have her next mammogram in 1/2018.     12. Other immunizations. Prescriptions printed for the Tdap and Zostervax     Brief history and med reconciliation completed by MA/LPN.   Reviewed by MD.

## 2017-08-31 LAB
CHOLEST SERPL-MCNC: 168 MG/DL (ref 100–199)
HDLC SERPL-MCNC: 44 MG/DL
LDLC SERPL CALC-MCNC: 97 MG/DL (ref 0–99)
TRIGL SERPL-MCNC: 133 MG/DL (ref 0–149)
VLDLC SERPL CALC-MCNC: 27 MG/DL (ref 5–40)

## 2017-09-20 ENCOUNTER — HOSPITAL ENCOUNTER (OUTPATIENT)
Dept: MAMMOGRAPHY | Age: 82
Discharge: HOME | End: 2017-09-20
Attending: FAMILY MEDICINE
Payer: MEDICARE

## 2017-09-20 DIAGNOSIS — M89.9 DISORDER OF BONE AND CARTILAGE, UNSPECIFIED: ICD-10-CM

## 2017-09-20 DIAGNOSIS — M94.9 DISORDER OF BONE AND CARTILAGE, UNSPECIFIED: ICD-10-CM

## 2017-09-20 PROCEDURE — 77080 DXA BONE DENSITY AXIAL: CPT

## 2017-09-22 NOTE — PROGRESS NOTES
Please inform the patient that her dexa shows mild changes, but is relatively stable when compared to prior results.

## 2017-10-19 RX ORDER — MECLIZINE HCL 12.5 MG 12.5 MG/1
12.5 TABLET ORAL
Qty: 30 TAB | Refills: 0 | Status: SHIPPED | OUTPATIENT
Start: 2017-10-19 | End: 2018-11-14 | Stop reason: SDUPTHER

## 2017-10-19 NOTE — TELEPHONE ENCOUNTER
----- Message from Velvet Ramos sent at 10/19/2017 10:49 AM EDT -----  Regarding: Dr. Ryan Bustos  Pt is out of med and is traveling out of town for the weekend. Pt is requesting a refill of Rx \"Meclizine\" to [address] which is on file. Best contact number is [phone].         Message copied/pasted from [facility]

## 2017-10-19 NOTE — TELEPHONE ENCOUNTER
Identified patient 2 identifiers verified. Patient reports that she take this only when traveling. meclizine 12.5 mg times per day if needed.  Last seen 8/31/17

## 2018-02-05 ENCOUNTER — HOSPITAL ENCOUNTER (OUTPATIENT)
Dept: MAMMOGRAPHY | Age: 83
Discharge: HOME | End: 2018-02-05
Attending: FAMILY MEDICINE
Payer: MEDICARE

## 2018-02-05 DIAGNOSIS — Z12.39 BREAST CANCER SCREENING: ICD-10-CM

## 2018-02-05 PROCEDURE — 77067 SCR MAMMO BI INCL CAD: CPT

## 2018-04-10 DIAGNOSIS — I10 ESSENTIAL HYPERTENSION: ICD-10-CM

## 2018-04-10 RX ORDER — AMLODIPINE BESYLATE 2.5 MG/1
TABLET ORAL
Qty: 90 TAB | Refills: 3 | Status: SHIPPED | OUTPATIENT
Start: 2018-04-10 | End: 2019-05-17 | Stop reason: SDUPTHER

## 2018-04-11 ENCOUNTER — OFFICE VISIT (OUTPATIENT)
Dept: INTERNAL MEDICINE CLINIC | Age: 83
End: 2018-04-11

## 2018-04-11 ENCOUNTER — HOSPITAL ENCOUNTER (OUTPATIENT)
Dept: LAB | Age: 83
Discharge: HOME | End: 2018-04-11
Payer: MEDICARE

## 2018-04-11 ENCOUNTER — TELEPHONE (OUTPATIENT)
Dept: INTERNAL MEDICINE CLINIC | Age: 83
End: 2018-04-11

## 2018-04-11 VITALS
SYSTOLIC BLOOD PRESSURE: 148 MMHG | TEMPERATURE: 98.3 F | DIASTOLIC BLOOD PRESSURE: 56 MMHG | HEIGHT: 67 IN | WEIGHT: 168.8 LBS | HEART RATE: 50 BPM | OXYGEN SATURATION: 100 % | BODY MASS INDEX: 26.49 KG/M2 | RESPIRATION RATE: 16 BRPM

## 2018-04-11 DIAGNOSIS — G47.00 INSOMNIA, UNSPECIFIED TYPE: ICD-10-CM

## 2018-04-11 DIAGNOSIS — I10 ESSENTIAL HYPERTENSION: Primary | ICD-10-CM

## 2018-04-11 PROCEDURE — 80053 COMPREHEN METABOLIC PANEL: CPT

## 2018-04-11 PROCEDURE — 36415 COLL VENOUS BLD VENIPUNCTURE: CPT

## 2018-04-11 RX ORDER — ESZOPICLONE 1 MG/1
1 TABLET, FILM COATED ORAL
Qty: 30 TAB | Refills: 3 | Status: SHIPPED | OUTPATIENT
Start: 2018-04-11

## 2018-04-11 NOTE — MR AVS SNAPSHOT
[address] 
[phone] Patient: Pascual Ocampo MRN: [id number] JFB:[id number] Visit Information Date & Time Provider Department Dept. Phone Encounter #  
 4/11/2018 10:15 AM Shruti Escobar, [address] [place] [address],[address] [phone] [id number] Follow-up Instructions Return in about 6 months (around 10/11/2018) for follow up htn. Upcoming Health Maintenance Date Due ZOSTER VACCINE AGE 60> 2/4/1995 Influenza Age 5 to Adult 8/1/2017 GLAUCOMA SCREENING Q2Y 2/10/2018 MEDICARE YEARLY EXAM 8/31/2018 DTaP/Tdap/Td series (2 - Td) 7/6/2021 Allergies as of 4/11/2018  Review Complete On: 4/11/2018 By: Shruti Escobar MD  
  
 Severity Noted Reaction Type Reactions Nsaids (Non-steroidal Anti-inflammatory Drug)  11/10/2011    Unknown (comments) GI upset Current Immunizations  Reviewed on 8/30/2017 Name Date Influenza High Dose Vaccine PF 11/15/2016 Influenza Vaccine 10/14/2015 10:41 AM, 10/1/2014, 11/1/2013 Influenza Vaccine Split 9/11/2012 Influenza Vaccine Whole 12/14/2011 Pneumococcal Conjugate (PCV-13) 8/30/2017  9:20 AM  
 TD Vaccine 7/1/2011 Tdap 7/6/2011 ZZZ-RETIRED (DO NOT USE) Pneumococcal Vaccine (Unspecified Type) 6/1/2006 Not reviewed this visit You Were Diagnosed With   
  
 Codes Comments Essential hypertension    -  Primary ICD-10-CM: I10 
ICD-9-CM: 401.9 Insomnia, unspecified type     ICD-10-CM: G47.00 ICD-9-CM: 780.52 Vitals BP Pulse Temp Resp Height(growth percentile) Weight(growth percentile) 148/56 (BP 1 Location: Right arm, BP Patient Position: Sitting) (!) 50 98.3 °F (36.8 °C) (Oral) 16 5' 6.5\" (1.689 m) 168 lb 12.8 oz (76.6 kg) LMP SpO2 BMI OB Status Smoking Status (LMP Unknown) 100% 26.84 kg/m2 Hysterectomy Never Smoker BMI and BSA Data Body Mass Index Body Surface Area 26.84 kg/m 2 1.9 m 2 Preferred Pharmacy Pharmacy Name Phone Stephanie Coello [address] [place] [address] [address] [address] [phone] Your Updated Medication List  
  
   
This list is accurate as of 4/11/18 11:25 AM.  Always use your most recent med list. amLODIPine 2.5 mg tablet Commonly known as:  Achilles Crowder TAKE 1 TABLET EVERY DAY  
  
 aspirin delayed-release 81 mg tablet Take 81 mg by mouth daily. atenolol-chlorthalidone 50-25 mg per tablet Commonly known as:  TENORETIC  
TAKE 1 TABLET BY MOUTH ONCE EVERY DAY  
  
 eszopiclone 1 mg tablet Commonly known as:  Myna Saravanan Take 1 Tab by mouth nightly. Max Daily Amount: 1 mg. MULTI VITAMIN PO Take  by mouth. Prescriptions Printed Refills  
 eszopiclone (LUNESTA) 1 mg tablet 3 Sig: Take 1 Tab by mouth nightly. Max Daily Amount: 1 mg. Class: Print Route: Oral  
  
We Performed the Following METABOLIC PANEL, COMPREHENSIVE [06469 CPT(R)] Follow-up Instructions Return in about 6 months (around 10/11/2018) for follow up htn. Introducing [facility] HE[facility] SERVICES! Dear Julius Kincaid: Thank you for requesting a [employer] account. Our records indicate that you already have an active [employer] account. You can access your account anytime at [email or web address] Did you know that you can access your hospital and ER discharge instructions at any time in [employer]? You can also review all of your test results from your hospital stay or ER visit. Additional Information If you have questions, please visit the Frequently Asked Questions section of the [employer] website at [email or web address] Remember, [employer] is NOT to be used for urgent needs. For medical emergencies, dial 911. Now available from your iPhone and Android! Please provide this summary of care documentation to your next provider. Your primary care clinician is listed as Vishal Siddiqui. If you have any questions after today's visit, please call [phone].

## 2018-04-11 NOTE — PROGRESS NOTES
Chief Complaint   Patient presents with    Hypertension     6 month followup     Reviewed record in preparation for visit and have obtained necessary documentation. Identified pt with two pt identifiers(name and ). Chief Complaint   Patient presents with    Hypertension     6 month followup       There were no vitals filed for this visit. Coordination of Care Questionnaire:  :     1) Have you been to an emergency room, urgent care clinic since your last visit? no   Hospitalized since your last visit? no             2) Have you seen or consulted any other health care providers outside of [facility] since your last visit? no  (Include any pap smears or colon screenings in this section.)    3) In the event something were to happen to you and you were unable to speak on your behalf, do you have an Advance Directive/ Living Will in place stating your wishes? YES    Do you have an Advance Directive on file? yes    4) Are you interested in receiving information on Advance Directives? NO    Patient is accompanied by self I have received verbal consent from Sravani Womack to discuss any/all medical information while they are present in the room.

## 2018-04-11 NOTE — TELEPHONE ENCOUNTER
Prescription for Lunesta 1mg #30 with 3 refills phoned into [facility] @ [id number] per Dr. Erick Early.

## 2018-04-11 NOTE — PROGRESS NOTES
SUBJECTIVE:   Ms. Carlton Bashir is a 80 y.o. female who is here for follow up of routine medical issues. Pt reports she is doing well and had a nice Easter. HTN: Pt is compliant in taking amlodipine and atenolol-chlorthalidone. Patient denies chest pain, LIGHT/SOB, edema, headache, visual changes, dizziness, palpitations or syncope. Pt's BP in the office today was elevated at 148/56 and on recheck was 138/70. Pt reports her BP at home is typically lower. Pt reports she gets regular exercise. Insomnia: Pt is no longer taking Ambien. She has had difficulty falling asleep and is feeling fatigued. PREVENTIVE:  Ophthalmology: current, every 2 years    At this time, she is otherwise doing well and has brought no other complaints to my attention today. For a list of the medical issues addressed today, see the assessment and plan below. PMH:   Past Medical History:   Diagnosis Date    Arthritis     inflammatory Lakeisha [place]); rheumatoid?  Bladder incontinence 1997    Contact dermatitis and other eczema, due to unspecified cause     rosacea; chronic urticaria; Dr. Jackson More Hypertension     Neuropathy, peripheral     Perennial allergic rhinitis     chronic sinusitis     PSH:  has a past surgical history that includes hx urological; pr foot/toes surgery proc unlisted (1/2001); hx drea and bso (1970's); endoscopy, colon, diagnostic (2001); hx breast biopsy (Left, long ago); and [facility] us bx breast lt vac asst (Left, 01/20/2016). All: is allergic to nsaids (non-steroidal anti-inflammatory drug). MEDS:   Current Outpatient Prescriptions   Medication Sig    eszopiclone (LUNESTA) 1 mg tablet Take 1 Tab by mouth nightly. Max Daily Amount: 1 mg.  amLODIPine (NORVASC) 2.5 mg tablet TAKE 1 TABLET EVERY DAY    atenolol-chlorthalidone (TENORETIC) 50-25 mg per tablet TAKE 1 TABLET BY MOUTH ONCE EVERY DAY    aspirin delayed-release 81 mg tablet Take 81 mg by mouth daily.     MULTIVIT &MINERALS/FERROUS FUM (MULTI VITAMIN PO) Take  by mouth. No current facility-administered medications for this visit. FH: family history includes Breast Cancer in her mother; Cancer in her sister; Cancer (age of onset: 79) in her mother; Diabetes in her brother; Other in her father. SH:  reports that she has never smoked. She has never used smokeless tobacco. She reports that she does not drink alcohol or use illicit drugs. Review of Systems - History obtained from the patient  General ROS: difficulty sleeping, fatigue no fever, chills, body aches  Psychological ROS: no change in anxiety, depression, SI/HI  Ophthalmic ROS: no blurred vision, myopia, double vision  ENT ROS: no dysphagia, otalgia, otorrhea, rhinorrhea, post nasal drip  Respiratory ROS: no cough, shortness of breath, or wheezing  Cardiovascular ROS: no chest pain or dyspnea on exertion  Gastrointestinal ROS: no abdominal pain, change in bowel habits, or black or bloody stools  Genito-Urinary ROS: no frequency, urgency, incontinence, dysuria, hematouria  Musculoskeletal ROS: no arthralagia, myalgia  Neurological ROS: no headaches, dizziness, lightheadedness, tremors, seizures  Dermatological ROS: no rash or lesions    OBJECTIVE:   Vitals:   Visit Vitals    /56 (BP 1 Location: Right arm, BP Patient Position: Sitting)    Pulse (!) 50    Temp 98.3 °F (36.8 °C) (Oral)    Resp 16    Ht 5' 6.5\" (1.689 m)    Wt 168 lb 12.8 oz (76.6 kg)    LMP  (LMP Unknown)    SpO2 100%    BMI 26.84 kg/m2      Gen: Pleasant 80 y.o.  female in NAD. HEENT: PERRLA. EOMI. OP moist and pink. Neck: Supple. No LAD. HEART: RRR, No M/G/R.      LUNGS: CTAB No W/R. ABDOMEN: S, NT, ND, BS+. EXTREMITIES: Warm. No C/C/E.    MUSCULOSKELETAL: Normal ROM, muscle strength 5/5 all groups. NEURO: Alert and oriented x 3. Cranial nerves grossly intact. No focal sensory or motor deficits noted. SKIN: Warm. Dry. No rashes or other lesions noted.     ASSESSMENT/ PLAN: Diagnoses and all orders for this visit:    1. Essential hypertension  -     METABOLIC PANEL, COMPREHENSIVE    2. Insomnia, unspecified type  -     eszopiclone (LUNESTA) 1 mg tablet; Take 1 Tab by mouth nightly. Max Daily Amount: 1 mg. ICD-10-CM ICD-9-CM    1. Essential hypertension Y56 353.1 METABOLIC PANEL, COMPREHENSIVE   2. Insomnia, unspecified type G47.00 780.52 eszopiclone (LUNESTA) 1 mg tablet      1. Hypertension  I recommended continuing current dose of amlodipine and atenolol-chlorthalidone, eating a low sodium diet, and increasing exercise. Recheck CMP. 2. Insomnia  I prescribed Lunesta to help her sleep. Follow-up Disposition:  Return in about 6 months (around 10/11/2018) for follow up htn. I have reviewed the patient's medications and risks/side effects/benefits were discussed. Diagnosis(-es) explained to patient and questions answered. Literature provided where appropriate.      Written by Cora Mead, as dictated by Ed Frazier MD.

## 2018-04-12 LAB
ALBUMIN SERPL-MCNC: 4.4 G/DL (ref 3.5–4.7)
ALBUMIN/GLOB SERPL: 1.5 {RATIO} (ref 1.2–2.2)
ALP SERPL-CCNC: 42 IU/L (ref 39–117)
ALT SERPL-CCNC: 14 IU/L (ref 0–32)
AST SERPL-CCNC: 17 IU/L (ref 0–40)
BILIRUB SERPL-MCNC: 0.4 MG/DL (ref 0–1.2)
BUN SERPL-MCNC: 15 MG/DL (ref 8–27)
BUN/CREAT SERPL: 27 (ref 12–28)
CALCIUM SERPL-MCNC: 10.7 MG/DL (ref 8.7–10.3)
CHLORIDE SERPL-SCNC: 98 MMOL/L (ref 96–106)
CO2 SERPL-SCNC: 31 MMOL/L (ref 18–29)
CREAT SERPL-MCNC: 0.56 MG/DL (ref 0.57–1)
GFR SERPLBLD CREATININE-BSD FMLA CKD-EPI: 100 ML/MIN/1.73
GFR SERPLBLD CREATININE-BSD FMLA CKD-EPI: 87 ML/MIN/1.73
GLOBULIN SER CALC-MCNC: 2.9 G/DL (ref 1.5–4.5)
GLUCOSE SERPL-MCNC: 91 MG/DL (ref 65–99)
POTASSIUM SERPL-SCNC: 3.5 MMOL/L (ref 3.5–5.2)
PROT SERPL-MCNC: 7.3 G/DL (ref 6–8.5)
SODIUM SERPL-SCNC: 141 MMOL/L (ref 134–144)

## 2018-04-30 DIAGNOSIS — I10 ESSENTIAL HYPERTENSION: ICD-10-CM

## 2018-05-01 ENCOUNTER — TELEPHONE (OUTPATIENT)
Dept: INTERNAL MEDICINE CLINIC | Age: 83
End: 2018-05-01

## 2018-05-01 NOTE — TELEPHONE ENCOUNTER
Patient states she needs a call back to be advised what she can do for persistent night sweats. Patient states OTC medication suggested by Dr. Flori Leon is not working & needs to be advised what she can do. Please call.  Thank you

## 2018-05-02 RX ORDER — ATENOLOL AND CHLORTHALIDONE TABLET 50; 25 MG/1; MG/1
TABLET ORAL
Qty: 90 TAB | Refills: 3 | Status: SHIPPED | OUTPATIENT
Start: 2018-05-02 | End: 2019-05-17 | Stop reason: SDUPTHER

## 2018-05-02 NOTE — TELEPHONE ENCOUNTER
#[phone] pt states she must speak with someone today regarding the night sweats. She is going out of town and sharing a bed and this needs to be taken care of prior to that. Is there anything she can do or take to stop these? Please call today using this number.

## 2018-05-02 NOTE — TELEPHONE ENCOUNTER
Identified patient 2 identifiers verified. Patient taking Geovanni Putnam OTC not working has been taking this for 2 years. Patient as told to ask pharmacist for any OTC Supplements that  May help her.

## 2018-05-04 NOTE — TELEPHONE ENCOUNTER
She will need to see endocrinology. Her sweating may not be related to hormone levels. What other symptoms are associated with the sweating . Seems like this has gotten worse in the past two months. She did not mention this at her recent visit.

## 2018-11-15 RX ORDER — MECLIZINE HCL 12.5 MG 12.5 MG/1
TABLET ORAL
Qty: 30 TAB | Refills: 0 | Status: SHIPPED | OUTPATIENT
Start: 2018-11-15 | End: 2020-01-24

## 2019-02-14 ENCOUNTER — HOSPITAL ENCOUNTER (OUTPATIENT)
Dept: MAMMOGRAPHY | Age: 84
Discharge: HOME | End: 2019-02-14
Attending: FAMILY MEDICINE
Payer: MEDICARE

## 2019-02-14 DIAGNOSIS — Z12.39 SCREENING BREAST EXAMINATION: ICD-10-CM

## 2019-02-14 PROCEDURE — 77063 BREAST TOMOSYNTHESIS BI: CPT

## 2019-02-26 ENCOUNTER — HOSPITAL ENCOUNTER (OUTPATIENT)
Dept: MAMMOGRAPHY | Age: 84
Discharge: HOME | End: 2019-02-26
Attending: FAMILY MEDICINE
Payer: MEDICARE

## 2019-02-26 DIAGNOSIS — R92.8 ABNORMAL MAMMOGRAM: ICD-10-CM

## 2019-02-26 PROCEDURE — 77065 DX MAMMO INCL CAD UNI: CPT

## 2019-04-30 ENCOUNTER — TELEPHONE (OUTPATIENT)
Dept: INTERNAL MEDICINE CLINIC | Age: 84
End: 2019-04-30

## 2019-04-30 NOTE — TELEPHONE ENCOUNTER
Pt believes she received a call from the practice , she did not know the reason for the call.  Pt's Best Contact Number: [phone]       Message received & copied from [facility]

## 2019-04-30 NOTE — TELEPHONE ENCOUNTER
Message was left that  There was no record to who called her. Patient also made aware that she has not been seen since 4/18 with Dr. Chalo Jimenez and that she should schedule a follow up appointment.

## 2019-05-01 ENCOUNTER — OFFICE VISIT (OUTPATIENT)
Dept: INTERNAL MEDICINE CLINIC | Age: 84
End: 2019-05-01

## 2019-05-01 VITALS
TEMPERATURE: 97.6 F | RESPIRATION RATE: 16 BRPM | WEIGHT: 178.2 LBS | HEART RATE: 51 BPM | OXYGEN SATURATION: 98 % | HEIGHT: 66 IN | BODY MASS INDEX: 28.64 KG/M2 | SYSTOLIC BLOOD PRESSURE: 131 MMHG | DIASTOLIC BLOOD PRESSURE: 67 MMHG

## 2019-05-01 DIAGNOSIS — Z78.0 POST-MENOPAUSAL: ICD-10-CM

## 2019-05-01 DIAGNOSIS — Z13.39 SCREENING FOR ALCOHOLISM: ICD-10-CM

## 2019-05-01 DIAGNOSIS — M94.9 DISORDER OF BONE AND CARTILAGE: ICD-10-CM

## 2019-05-01 DIAGNOSIS — Z00.00 MEDICARE ANNUAL WELLNESS VISIT, SUBSEQUENT: Primary | ICD-10-CM

## 2019-05-01 DIAGNOSIS — I10 ESSENTIAL HYPERTENSION: ICD-10-CM

## 2019-05-01 DIAGNOSIS — H53.9 VISION CHANGES: ICD-10-CM

## 2019-05-01 DIAGNOSIS — Z13.31 SCREENING FOR DEPRESSION: ICD-10-CM

## 2019-05-01 DIAGNOSIS — M89.9 DISORDER OF BONE AND CARTILAGE: ICD-10-CM

## 2019-05-01 NOTE — PATIENT INSTRUCTIONS
Medicare Wellness Visit, Female The best way to live healthy is to have a lifestyle where you eat a well-balanced diet, exercise regularly, limit alcohol use, and quit all forms of tobacco/nicotine, if applicable. Regular preventive services are another way to keep healthy. Preventive services (vaccines, screening tests, monitoring & exams) can help personalize your care plan, which helps you manage your own care. Screening tests can find health problems at the earliest stages, when they are easiest to treat. [facility] follows the current, evidence-based guidelines published by the [facility] Shamir Shobha ([facility]) when recommending preventive services for our patients. Because we follow these guidelines, sometimes recommendations change over time as research supports it. (For example, mammograms used to be recommended annually. Even though Medicare will still pay for an annual mammogram, the newer guidelines recommend a mammogram every two years for women of average risk.) Of course, you and your doctor may decide to screen more often for some diseases, based on your risk and your health status. Preventive services for you include: - Medicare offers their members a free annual wellness visit, which is time for you and your primary care provider to discuss and plan for your preventive service needs. Take advantage of this benefit every year! 
-All adults over the age of 72 should receive the recommended pneumonia vaccines. Current USPSTF guidelines recommend a series of two vaccines for the best pneumonia protection.  
-All adults should have a flu vaccine yearly and a tetanus vaccine every 10 years. All adults age 61 and older should receive a shingles vaccine once in their lifetime.   
-A bone mass density test is recommended when a woman turns 65 to screen for osteoporosis. This test is only recommended one time, as a screening. Some providers will use this same test as a disease monitoring tool if you already have osteoporosis. -All adults age 38-68 who are overweight should have a diabetes screening test once every three years.  
-Other screening tests and preventive services for persons with diabetes include: an eye exam to screen for diabetic retinopathy, a kidney function test, a foot exam, and stricter control over your cholesterol.  
-Cardiovascular screening for adults with routine risk involves an electrocardiogram (ECG) at intervals determined by your doctor.  
-Colorectal cancer screenings should be done for adults age 54-65 with no increased risk factors for colorectal cancer. There are a number of acceptable methods of screening for this type of cancer. Each test has its own benefits and drawbacks. Discuss with your doctor what is most appropriate for you during your annual wellness visit. The different tests include: colonoscopy (considered the best screening method), a fecal occult blood test, a fecal DNA test, and sigmoidoscopy. -Breast cancer screenings are recommended every other year for women of normal risk, age 54-69. 
-Cervical cancer screenings for women over age 72 are only recommended with certain risk factors.  
-All adults born between [facility] should be screened once for Hepatitis C. Here is a list of your current Health Maintenance items (your personalized list of preventive services) with a due date: 
Health Maintenance Due Topic Date Due  Shingles Vaccine (1 of 2) 04/04/1985  Glaucoma Screening   02/10/2018  Pneumococcal Vaccine (2 of 2 - PPSV23) 08/30/2018 Anisha Hubbard Annual Well Visit  08/31/2018

## 2019-05-01 NOTE — PROGRESS NOTES
This is the Subsequent Medicare Annual Wellness Exam, performed 12 months or more after the Initial AWV or the last Subsequent AWV    I have reviewed the patient's medical history in detail and updated the computerized patient record. History     Past Medical History:   Diagnosis Date    Arthritis     inflammatory [place]an [place]); rheumatoid?  Bladder incontinence 1997    Contact dermatitis and other eczema, due to unspecified cause     rosacea; chronic urticaria; Dr. Aris Olvera Hypertension     Neuropathy, peripheral     Perennial allergic rhinitis     chronic sinusitis      Past Surgical History:   Procedure Laterality Date    ENDOSCOPY, COLON, DIAGNOSTIC  2001    negative    FOOT/TOES SURGERY PROC UNLISTED  1/2001    hammer toe surg.  HX BREAST BIOPSY Left long ago    Surgical bx--benign    HX DARYA AND BSO  1970's    endometriosis    HX UROLOGICAL      s/p bladder procedure(s)    US GUIDE ASP BREAST LT CYST W NDL Left 2016     Current Outpatient Medications   Medication Sig Dispense Refill    meclizine (ANTIVERT) 12.5 mg tablet TAKE 1 TABLET BY MOUTH THREE TIMES DAILY EVERY DAY AS NEEDED FOR UP TO 10 DAYS 30 Tab 0    atenolol-chlorthalidone (TENORETIC) 50-25 mg per tablet TAKE 1 TABLET ONE TIME DAILY 90 Tab 3    eszopiclone (LUNESTA) 1 mg tablet Take 1 Tab by mouth nightly. Max Daily Amount: 1 mg. 30 Tab 3    amLODIPine (NORVASC) 2.5 mg tablet TAKE 1 TABLET EVERY DAY 90 Tab 3    aspirin delayed-release 81 mg tablet Take 81 mg by mouth daily.  MULTIVIT &MINERALS/FERROUS FUM (MULTI VITAMIN PO) Take  by mouth.        Allergies   Allergen Reactions    Nsaids (Non-Steroidal Anti-Inflammatory Drug) Unknown (comments)     GI upset     Family History   Problem Relation Age of Onset    Cancer Mother 79        breast    Breast Cancer Mother         mother was in her 62s    Other Father         PVD    Cancer Sister         uterine, metastatic    Diabetes Brother      Social History Tobacco Use    Smoking status: Never Smoker    Smokeless tobacco: Never Used   Substance Use Topics    Alcohol use: No     Alcohol/week: 0.0 oz     Patient Active Problem List   Diagnosis Code    HTN (hypertension) I10    Osteoarthritis M19.90    S/P DARYA-BSO Z90.710, Z90.722, Z90.79    Inflammatory arthritis M19.90    Hypertension I10    Neuropathy, peripheral G62.9    History of colonoscopy Z98.890    Insomnia G47.00    Hx of mammogram Z92.89    Dense breasts R92.2    Osteopenia M85.80       Depression Risk Factor Screening:     3 most recent PHQ Screens 5/1/2019   Little interest or pleasure in doing things Not at all   Feeling down, depressed, irritable, or hopeless Not at all   Total Score PHQ 2 0     Alcohol Risk Factor Screening: You do not drink alcohol or very rarely. Functional Ability and Level of Safety:   Hearing Loss  Hearing is good. Activities of Daily Living  The home contains: no safety equipment. Patient does total self care    Fall Risk  Fall Risk Assessment, last 12 mths 5/1/2019   Able to walk? Yes   Fall in past 12 months? No       Abuse Screen  Patient is not abused    Cognitive Screening   Evaluation of Cognitive Function:  Has your family/caregiver stated any concerns about your memory: no  Normal    Patient Care Team   Patient Care Team:  Damien Lowery MD as PCP - General (Family Practice)  Mayo Danielle (Rheumatology)  Philip To    Assessment/Plan   Education and counseling provided:  Are appropriate based on today's review and evaluation  Cardiovascular screening blood test  Bone mass measurement (DEXA)  Screening for glaucoma    Diagnoses and all orders for this visit:    1. Medicare annual wellness visit, subsequent    2. Post-menopausal  -     DEXA BONE DENSITY STUDY AXIAL; Future    3. Screening for alcoholism  -     [place] ANNUAL ALCOHOL SCREEN 15 MIN    4. Screening for depression  -     DEPRESSION SCREEN ANNUAL    5.  Disorder of bone and cartilage  -     DEXA BONE DENSITY STUDY AXIAL; Future        Health Maintenance Due   Topic Date Due    Shingrix Vaccine Age 49> (1 of 2) 04/04/1985    GLAUCOMA SCREENING Q2Y  02/10/2018    Pneumococcal 65+ years (2 of 2 - PPSV23) 08/30/2018    MEDICARE YEARLY EXAM  08/31/2018       PREVENTIVE:  Colonoscopy: N/A    Mammogram: 2/14/19, family hx of breast cancer (mother)    Dexa: 9/20/17, osteopenic    Td: 7/1/11 Tdap: 7/6/11    Pneumonia vaccine: PCV-13: 8/30/17    Shingrix: declined    Eye Exam: due, reminded to schedule    ACP-ACP planning continued today. I explained the purpose of the advance medical directive. Patient expressed interest in reviewing material.  I provided the patient with a \"Your Right to Decide\" booklet, [employer] Kit, and a copy of an Advance Directive.   Patient agrees to providing a copy to the office when available.

## 2019-05-01 NOTE — PROGRESS NOTES
Shwetha Bernabe is a 80 y.o. female     Chief Complaint   Patient presents with    Annual Wellness Visit     c/o night sweats. Visit Vitals  /67 (BP 1 Location: Left arm, BP Patient Position: Sitting)   Pulse (!) 51   Temp 97.6 °F (36.4 °C) (Oral)   Resp 16   Ht 5' 6\" (1.676 m)   Wt 178 lb 3.2 oz (80.8 kg)   LMP  (LMP Unknown)   SpO2 98%   BMI 28.76 kg/m²       Health Maintenance Due   Topic Date Due    Shingrix Vaccine Age 49> (1 of 2) 04/04/1985    GLAUCOMA SCREENING Q2Y  02/10/2018    Pneumococcal 65+ years (2 of 2 - PPSV23) 08/30/2018    MEDICARE YEARLY EXAM  08/31/2018       1. Have you been to the ER, urgent care clinic since your last visit? Hospitalized since your last visit? No    2. Have you seen or consulted any other health care providers outside of the [address] [place] [address] since your last visit? Include any pap smears or colon screening.  No

## 2019-05-01 NOTE — PROGRESS NOTES
SUBJECTIVE:   Ms. Merlinda Pun is a 80 y.o. female who is here for follow up of routine medical issues. Pt c/o seeing flashing lights since arriving at the office today. Pt reports this is resolving throughout the appointment. The sensation occurs when her eyes are open and closed. Pt is due for an eye exam. She does not have glaucoma or cataracts. Pt denies vision changes, eye pain, headaches, lightheadedness, dizziness, generalized pain, CP, SOB. Pt endorses regular exercise walking 2-3x/week and going to chair yoga 2x/week. Pt's weight today in the office was 178lb, up 10lb x 4/2018. HTN: Pt's BP in the office today was 131/67. Pt is compliant in taking atenolol-chlorthalidone and amlodipine. Patient denies chest pain, LIGHT/SOB, edema, headache, visual changes, dizziness, palpitations or syncope. Pt reports intermittent episodes of incontinence with urinary urgency. Pt reports she is typically able to make it to the restroom if she has the urge. She uses pads. She denies stress incontinence. Pt declined a referral to uro-gyn. PREVENTIVE:  Colonoscopy:  Mammogram: 2/14/19, family hx of breast cancer (mother)  Dexa: 9/20/17, osteopenic  Td: 7/1/11 Tdap: 7/6/11  Pneumonia vaccine: PCV-13: 8/30/17  Shingrix: declined  Eye Exam: due, reminded to schedule    At this time, she is otherwise doing well and has brought no other complaints to my attention today. For a list of the medical issues addressed today, see the assessment and plan below. PMH:   Past Medical History:   Diagnosis Date    Arthritis     inflammatory [place]); rheumatoid?     Bladder incontinence 1997    Contact dermatitis and other eczema, due to unspecified cause     rosacea; chronic urticaria; Dr. Mitzi Murillo Hypertension     Neuropathy, peripheral     Perennial allergic rhinitis     chronic sinusitis     PSH:  has a past surgical history that includes hx urological; pr foot/toes surgery proc unlisted (1/2001); hx drea and bso (1970's); endoscopy, colon, diagnostic (2001); hx breast biopsy (Left, long ago); and us guide asp breast lt cyst w ndl (Left, 2016). All: is allergic to nsaids (non-steroidal anti-inflammatory drug). MEDS:   Current Outpatient Medications   Medication Sig    meclizine (ANTIVERT) 12.5 mg tablet TAKE 1 TABLET BY MOUTH THREE TIMES DAILY EVERY DAY AS NEEDED FOR UP TO 10 DAYS    atenolol-chlorthalidone (TENORETIC) 50-25 mg per tablet TAKE 1 TABLET ONE TIME DAILY    eszopiclone (LUNESTA) 1 mg tablet Take 1 Tab by mouth nightly. Max Daily Amount: 1 mg.  amLODIPine (NORVASC) 2.5 mg tablet TAKE 1 TABLET EVERY DAY    aspirin delayed-release 81 mg tablet Take 81 mg by mouth daily.  MULTIVIT &MINERALS/FERROUS FUM (MULTI VITAMIN PO) Take  by mouth. No current facility-administered medications for this visit. FH: family history includes Breast Cancer in her mother; Cancer in her sister; Cancer (age of onset: 79) in her mother; Diabetes in her brother; Other in her father. SH:  reports that she has never smoked. She has never used smokeless tobacco. She reports that she does not drink alcohol or use drugs.      Review of Systems - History obtained from the patient  General ROS: no fever, chills, fatigue, body aches  Psychological ROS: no change in anxiety, depression, SI/HI  Ophthalmic ROS: seeing flashing lights no blurred vision, myopia, double vision  ENT ROS: no dysphagia, otalgia, otorrhea, rhinorrhea, post nasal drip  Respiratory ROS: no cough, shortness of breath, or wheezing  Cardiovascular ROS: no chest pain or dyspnea on exertion  Gastrointestinal ROS: no abdominal pain, change in bowel habits, or black or bloody stools  Genito-Urinary ROS: urgency, incontinence, no frequency,  dysuria, hematuria  Musculoskeletal ROS: no arthralgia, myalgia  Neurological ROS: no headaches, dizziness, lightheadedness, tremors, seizures  Dermatological ROS: no rash or lesions    OBJECTIVE:   Vitals: Visit Vitals  /67 (BP 1 Location: Left arm, BP Patient Position: Sitting)   Pulse (!) 51   Temp 97.6 °F (36.4 °C) (Oral)   Resp 16   Ht 5' 6\" (1.676 m)   Wt 178 lb 3.2 oz (80.8 kg)   LMP  (LMP Unknown)   SpO2 98%   BMI 28.76 kg/m²      Gen: Pleasant 80 y.o.  female in NAD. HEENT: + cerumen L ear. R TM normal and canal clear bilaterally. PERRLA. EOMI. OP moist and pink. Neck: Supple. No LAD. No carotid bruits. HEART: RRR, No M/G/R.      LUNGS: CTAB No W/R. ABDOMEN: S, NT, ND, BS+. EXTREMITIES: Warm. No C/C/E.    MUSCULOSKELETAL: Normal ROM, muscle strength 5/5 all groups. NEURO: Alert and oriented x 3. Cranial nerves grossly intact. No focal sensory or motor deficits noted. SKIN: Warm. Dry. No rashes or other lesions noted. ASSESSMENT/ PLAN: Diagnoses and all orders for this visit:    1. Medicare annual wellness visit, subsequent    2. Post-menopausal  -     DEXA BONE DENSITY STUDY AXIAL; Future    3. Screening for alcoholism  -     [place] ANNUAL ALCOHOL SCREEN 15 MIN    4. Screening for depression  -     DEPRESSION SCREEN ANNUAL    5. Disorder of bone and cartilage  -     DEXA BONE DENSITY STUDY AXIAL; Future    6. Vision changes    7. Essential hypertension  -     METABOLIC PANEL, COMPREHENSIVE        ICD-10-CM ICD-9-CM    1. Medicare annual wellness visit, subsequent Z00.00 V70.0    2. Post-menopausal Z78.0 V49.81 DEXA BONE DENSITY STUDY AXIAL   3. Screening for alcoholism Z13.39 V79.1 [place] ANNUAL ALCOHOL SCREEN 15 MIN   4. Screening for depression Z13.31 V79.0 DEPRESSION SCREEN ANNUAL   5. Disorder of bone and cartilage M89.9 733.90 DEXA BONE DENSITY STUDY AXIAL    M94.9     6. Vision changes H53.9 368.9    7. Essential hypertension P38 451.9 METABOLIC PANEL, COMPREHENSIVE      1. Medicare Annual Wellness Visit  See attached note. 2. Post-menopausal   Pt is due for a DEXA scan to screen for osteoporosis. Order given today. 3. Screening for alcoholism    4.  Screening for depression    5. Disorder of bone and cartilage  Pt is due for a DEXA scan to screen for osteoporosis. Order given today. 6. Vision changes  Pt states the flashing lights sensation is resolved throughout the appointment today. She declined a referral to see Dr. Lloyd Mejía (ophthalmology) today. I advised pt to follow up with her ophthalmologist for an eye exam.     7. Hypertension  BP seems to be well controlled. I recommended continuing current dose of atenolol-chlorthalidone and amlodipine, eating a low sodium diet, and increasing exercise. Recheck CMP. Follow-up and Dispositions    · Return in about 1 year (around 5/1/2020) for annual wellness and 6 mo for htn. I have reviewed the patient's medications and risks/side effects/benefits were discussed. Diagnosis(-es) explained to patient and questions answered. Literature provided where appropriate.      Written by Carloz Shepherd, as dictated by Alanna Peng MD.

## 2019-05-02 LAB
ALBUMIN SERPL-MCNC: 4.2 G/DL (ref 3.5–4.7)
ALBUMIN/GLOB SERPL: 1.4 {RATIO} (ref 1.2–2.2)
ALP SERPL-CCNC: 37 IU/L (ref 39–117)
ALT SERPL-CCNC: 19 IU/L (ref 0–32)
AST SERPL-CCNC: 19 IU/L (ref 0–40)
BILIRUB SERPL-MCNC: 0.4 MG/DL (ref 0–1.2)
BUN SERPL-MCNC: 12 MG/DL (ref 8–27)
BUN/CREAT SERPL: 20 (ref 12–28)
CALCIUM SERPL-MCNC: 9.7 MG/DL (ref 8.7–10.3)
CHLORIDE SERPL-SCNC: 101 MMOL/L (ref 96–106)
CO2 SERPL-SCNC: 28 MMOL/L (ref 20–29)
CREAT SERPL-MCNC: 0.6 MG/DL (ref 0.57–1)
GLOBULIN SER CALC-MCNC: 3 G/DL (ref 1.5–4.5)
GLUCOSE SERPL-MCNC: 98 MG/DL (ref 65–99)
POTASSIUM SERPL-SCNC: 3.8 MMOL/L (ref 3.5–5.2)
PROT SERPL-MCNC: 7.2 G/DL (ref 6–8.5)
SODIUM SERPL-SCNC: 141 MMOL/L (ref 134–144)

## 2019-05-03 ENCOUNTER — TELEPHONE (OUTPATIENT)
Dept: INTERNAL MEDICINE CLINIC | Age: 84
End: 2019-05-03

## 2019-05-03 NOTE — TELEPHONE ENCOUNTER
Identified patient 2 identifiers verified. Patient made aware of appointment details and information mailed for Dexa Scan.

## 2019-05-03 NOTE — TELEPHONE ENCOUNTER
Pt needs a call as she doesn't know how to schedule the dexa scan and was to have this done by 5-4-19 it said on her take home paper. Please call to help the pt as she states she is a bit confused on how to go about all of this. Thanks.

## 2019-05-06 ENCOUNTER — TELEPHONE (OUTPATIENT)
Dept: INTERNAL MEDICINE CLINIC | Age: 84
End: 2019-05-06

## 2019-05-06 NOTE — TELEPHONE ENCOUNTER
----- Message from Damien Palacios sent at 5/6/2019  4:15 PM EDT -----  Regarding: Dr. Dick Emmanuel/Telephone   Pt is requesting recommendations to Bone Density locations.  Pt's Best Contact Number: [phone]

## 2019-05-07 NOTE — TELEPHONE ENCOUNTER
Identified patient 2 identifiers verified. Reviewed with patient appointment details for Dexa Scan. The appointment information has also been mailed to patient.

## 2019-05-13 ENCOUNTER — TELEPHONE (OUTPATIENT)
Dept: INTERNAL MEDICINE CLINIC | Age: 84
End: 2019-05-13

## 2019-05-13 DIAGNOSIS — R61 CHRONIC NIGHT SWEATS: Primary | ICD-10-CM

## 2019-05-13 NOTE — TELEPHONE ENCOUNTER
#[phone]  Pt states she is having night sweats and gets up 3-4 times a night to change as she is wringing wet. Please help. Is there medication or someone she should be seeing for this? Please call pt today. Thanks.

## 2019-05-13 NOTE — TELEPHONE ENCOUNTER
Identified patient 2 identifiers verified. Patient reports  Night sweats have worsened recently wants to know if anything could be done.

## 2019-05-17 DIAGNOSIS — I10 ESSENTIAL HYPERTENSION: ICD-10-CM

## 2019-05-17 RX ORDER — ATENOLOL AND CHLORTHALIDONE TABLET 50; 25 MG/1; MG/1
TABLET ORAL
Qty: 90 TAB | Refills: 3 | Status: SHIPPED | OUTPATIENT
Start: 2019-05-17

## 2019-05-17 RX ORDER — AMLODIPINE BESYLATE 2.5 MG/1
TABLET ORAL
Qty: 90 TAB | Refills: 3 | Status: SHIPPED | OUTPATIENT
Start: 2019-05-17 | End: 2020-05-11

## 2019-05-17 NOTE — TELEPHONE ENCOUNTER
PCP: Genny Chris MD    Last appt: 5/1/2019  Future Appointments   Date Time Provider Dao Solomon   10/1/2019  8:30 AM [facility] DEXA 1 [address]       Requested Prescriptions     Pending Prescriptions Disp Refills    atenolol-chlorthalidone (TENORETIC) 50-25 mg per tablet 90 Tab 3     Sig: TAKE 1 TABLET ONE TIME DAILY    amLODIPine (NORVASC) 2.5 mg tablet 90 Tab 3     Sig: TAKE 1 TABLET EVERY DAY

## 2019-05-20 ENCOUNTER — TELEPHONE (OUTPATIENT)
Dept: INTERNAL MEDICINE CLINIC | Age: 84
End: 2019-05-20

## 2019-05-20 DIAGNOSIS — R61 GENERALIZED HYPERHIDROSIS: Primary | ICD-10-CM

## 2019-05-20 DIAGNOSIS — R61 NIGHT SWEATS: Primary | ICD-10-CM

## 2019-05-20 NOTE — TELEPHONE ENCOUNTER
Spoke with patient. Two pt identifiers confirmed. Patient advised per Dr. Alexander Found that she has ordered some additional labs and a CXR to evaluate night sweats. Pt verbalized understanding of information discussed w/ no further questions at this time.

## 2019-05-23 LAB
DOPAMINE SERPL-MCNC: <30 PG/ML (ref 0–48)
EPINEPH PLAS-MCNC: 76 PG/ML (ref 0–62)
METANEPH FREE SERPL-MCNC: 58 PG/ML (ref 0–62)
NOREPINEPH PLAS-MCNC: 430 PG/ML (ref 0–874)
NORMETANEPHRINE SERPL-MCNC: 91 PG/ML (ref 0–145)
T4 SERPL-MCNC: 5.8 UG/DL (ref 4.5–12)
TSH RECEP AB SER-ACNC: <0.5 IU/L (ref 0–1.75)

## 2019-06-03 ENCOUNTER — TELEPHONE (OUTPATIENT)
Dept: INTERNAL MEDICINE CLINIC | Age: 84
End: 2019-06-03

## 2019-06-03 DIAGNOSIS — Z90.79 S/P TAH-BSO: ICD-10-CM

## 2019-06-03 DIAGNOSIS — R61 CHRONIC NIGHT SWEATS: Primary | ICD-10-CM

## 2019-06-03 DIAGNOSIS — Z90.710 S/P TAH-BSO: ICD-10-CM

## 2019-06-03 DIAGNOSIS — Z90.722 S/P TAH-BSO: ICD-10-CM

## 2019-06-03 NOTE — TELEPHONE ENCOUNTER
Is she still having the sweats? Please pend an order for an complete abdominal ultrasound     Thyroid function -normal  Slight elevation in epinephrine.

## 2019-06-03 NOTE — TELEPHONE ENCOUNTER
----- Message from Annamarie Hanna MD sent at 6/2/2019  8:25 PM EDT -----  Is she still having the sweats? Please pend an order for an complete abdominal ultrasound     Thyroid function -normal  Slight elevation in epinephrine.

## 2019-06-17 ENCOUNTER — HOSPITAL ENCOUNTER (OUTPATIENT)
Dept: ULTRASOUND IMAGING | Age: 84
Discharge: HOME | End: 2019-06-17
Attending: FAMILY MEDICINE
Payer: MEDICARE

## 2019-06-17 DIAGNOSIS — Z90.722 S/P TAH-BSO: ICD-10-CM

## 2019-06-17 DIAGNOSIS — Z90.79 S/P TAH-BSO: ICD-10-CM

## 2019-06-17 DIAGNOSIS — R61 CHRONIC NIGHT SWEATS: ICD-10-CM

## 2019-06-17 DIAGNOSIS — Z90.710 S/P TAH-BSO: ICD-10-CM

## 2019-06-17 PROCEDURE — 76700 US EXAM ABDOM COMPLETE: CPT

## 2019-06-20 ENCOUNTER — TELEPHONE (OUTPATIENT)
Dept: INTERNAL MEDICINE CLINIC | Age: 84
End: 2019-06-20

## 2019-06-20 DIAGNOSIS — R61 SWEATING PROFUSELY: Primary | ICD-10-CM

## 2019-06-20 NOTE — PROGRESS NOTES
Called, spoke to pt. Two identifiers confirmed.   Notified pt of US results per Dr. Syed Hendricks.   Pt verbalized understanding of information discussed w/ no further questions at this time

## 2019-06-25 NOTE — TELEPHONE ENCOUNTER
#[phone] pt states she can't see Dr. Dion Addison until Sept.  She needs to see someone sooner and needs to know who to see.   Please call

## 2019-06-25 NOTE — TELEPHONE ENCOUNTER
Spoke to Dr. Kam Veloz office and was told that Dr. Faviola Otoole could send an Epic message to Dr. Emma Burgess to get a sooner appointment and  And according to that office patient has not been scheduled

## 2019-06-26 NOTE — TELEPHONE ENCOUNTER
Message was left for  A call back to schedule new patient appointment. Contact number for patient was left on message also.

## 2019-06-26 NOTE — TELEPHONE ENCOUNTER
Bety ,   I printed a referral to a different endocrinologist. Please call to see when she has an opening.

## 2019-08-30 ENCOUNTER — TELEPHONE (OUTPATIENT)
Dept: INTERNAL MEDICINE CLINIC | Age: 84
End: 2019-08-30

## 2019-08-30 NOTE — TELEPHONE ENCOUNTER
----- Message from Evelia Montalvo sent at 8/30/2019  2:32 PM EDT -----  Regarding: Dr. Diallo Vega Message/Vendor Calls    Caller's first and last name:      Reason for call: To let Dr. Huang Aldrich know of her diagnosis     Callback required yes/no and why:  Yes, if there are any questions     Best contact number(s):  [phone]    Details to clarify the request:  Pt states she went to [place] on 8/30/2019 and she was diagnosed with a Calcaneal Spurs on her right foot. Pt states they recommended she has surgery but she would like to know what Dr. Huang Aldrich thinks.      Evelia Montalvo

## 2019-09-03 NOTE — TELEPHONE ENCOUNTER
Called, spoke with Pt. Two pt identifiers confirmed. Pt informed she needs to come and speak w/ Dr. Ken Mercado re: Calcaneal Spurs [address] on her right foot. Pt offered and accepted appt for 09/06/19 @ 8:45am.   Pt verbalized understanding of information discussed w/ no further questions at this time.

## 2019-09-25 ENCOUNTER — TELEPHONE (OUTPATIENT)
Dept: INTERNAL MEDICINE CLINIC | Age: 84
End: 2019-09-25

## 2019-09-25 NOTE — TELEPHONE ENCOUNTER
Called, spoke with Pt. Two pt identifiers confirmed. Pt informed per Dr. Kimberly López to see a podiatrist.   Pt states she's better and walk on her foot, but will keep that in mind for next time. Pt verbalized understanding of information discussed w/ no further questions at this time.

## 2019-10-01 ENCOUNTER — HOSPITAL ENCOUNTER (OUTPATIENT)
Dept: MAMMOGRAPHY | Age: 84
Discharge: HOME | End: 2019-10-01
Attending: FAMILY MEDICINE
Payer: MEDICARE

## 2019-10-01 DIAGNOSIS — Z78.0 POST-MENOPAUSAL: ICD-10-CM

## 2019-10-01 DIAGNOSIS — M89.9 DISORDER OF BONE AND CARTILAGE: ICD-10-CM

## 2019-10-01 DIAGNOSIS — M94.9 DISORDER OF BONE AND CARTILAGE: ICD-10-CM

## 2019-10-01 PROCEDURE — 77080 DXA BONE DENSITY AXIAL: CPT

## 2019-10-02 NOTE — PROGRESS NOTES
Please send a results letter. She has osteopenia, but her bone density is stable.  No change since the last test.

## 2019-10-04 ENCOUNTER — TELEPHONE (OUTPATIENT)
Dept: INTERNAL MEDICINE CLINIC | Age: 84
End: 2019-10-04

## 2019-10-04 DIAGNOSIS — R61 SWEAT, SWEATING, EXCESSIVE: Primary | ICD-10-CM

## 2019-10-04 NOTE — TELEPHONE ENCOUNTER
#[phone] pt states her night sweats are more severe. Pt is requesting something to be called in for these. [facility] on file.

## 2019-10-07 RX ORDER — OXYBUTYNIN CHLORIDE 5 MG/1
5 TABLET ORAL EVERY EVENING
Qty: 30 TAB | Refills: 0 | Status: SHIPPED | OUTPATIENT
Start: 2019-10-07 | End: 2019-10-07 | Stop reason: SDUPTHER

## 2019-10-07 NOTE — TELEPHONE ENCOUNTER
She has been referred to two different endocrinologists. Did she make any appointments. I sent a medication to her pharmacy.

## 2019-10-07 NOTE — TELEPHONE ENCOUNTER
Identified patient 2 identifiers verified. Patient aware of prescription has had not seen Endocrinology.

## 2019-10-08 NOTE — TELEPHONE ENCOUNTER
Called, spoke with Pt. Two pt identifiers confirmed. Pt informed per Dr. Stephanie Montesinos, she needs to see an Endocrinology to help with her night sweats and the medicine was sent to the pharmacy. -Gave Pt contact information for Dr. Tino Salas-  Pt verbalized understanding of information discussed w/ no further questions at this time.

## 2020-01-24 ENCOUNTER — OFFICE VISIT (OUTPATIENT)
Dept: ENDOCRINOLOGY | Age: 85
End: 2020-01-24

## 2020-01-24 VITALS
HEART RATE: 52 BPM | WEIGHT: 188.4 LBS | HEIGHT: 66 IN | SYSTOLIC BLOOD PRESSURE: 141 MMHG | DIASTOLIC BLOOD PRESSURE: 53 MMHG | BODY MASS INDEX: 30.28 KG/M2

## 2020-01-24 DIAGNOSIS — R61 NIGHT SWEATS: Primary | ICD-10-CM

## 2020-01-24 NOTE — PROGRESS NOTES
Chief Complaint   Patient presents with    Sweats     Night sweats. PCP and pharmacy verified     History of Present Illness: Sebastián Eubanks is a 80 y.o. female who I was asked to see in consult by Dr. Alanna Peng for evaluation of night sweats. Pt notes that she has been experiencing these night sweats \"for several months\". She notes the sweats only occur at night. She notes that \"I am dry all day, but when I wake up in the morning I am soaked\". She has been taking an OTC \"estrovent for menopause\" supplement   She denies any illnesses, injuries or traumas prior to the onset of the night sweats. Pt notes that \"I have been on the same medications for years I have not started any new medications recently\". She denies issues of HA, CP, SOB, vision changes, tremors. She denies issues of diarrhea, constipation, N/V, abdominal pains. Pt notes that she had \"surgical menopause\" in her 42's. She does not recall having issues of night sweats after her hysterectomy (which she had for endometriosis). Pt reports that she has had these night sweats in the past , but she does not recall when this was, but she notes that they did resolve in the past, but did not last as long as these night sweats have been occurring. Dr. Valerie Higgins has already tested her for hyperthyroidism, pheochromocytoma and Neuroendocrine tumor, all of which were negative. Past Medical History:   Diagnosis Date    Arthritis     inflammatory Felipe Cohens); rheumatoid?  Bladder incontinence 1997    Contact dermatitis and other eczema, due to unspecified cause     rosacea; chronic urticaria; Dr. Jesica William Hypertension     Neuropathy, peripheral     Perennial allergic rhinitis     chronic sinusitis     Past Surgical History:   Procedure Laterality Date    ENDOSCOPY, COLON, DIAGNOSTIC  2001    negative    FOOT/TOES SURGERY PROC UNLISTED  1/2001    hammer toe surg.     HX BREAST BIOPSY Left long ago    Surgical bx--benign    HX DARYA AND BSO 1970's    endometriosis    HX UROLOGICAL      s/p bladder procedure(s)    US GUIDE ASP BREAST LT CYST W NDL Left 2016     Current Outpatient Medications   Medication Sig    atenolol-chlorthalidone (TENORETIC) 50-25 mg per tablet TAKE 1 TABLET ONE TIME DAILY    amLODIPine (NORVASC) 2.5 mg tablet TAKE 1 TABLET EVERY DAY    aspirin delayed-release 81 mg tablet Take 81 mg by mouth daily.  MULTIVIT &MINERALS/FERROUS FUM (MULTI VITAMIN PO) Take  by mouth.  eszopiclone (LUNESTA) 1 mg tablet Take 1 Tab by mouth nightly. Max Daily Amount: 1 mg. No current facility-administered medications for this visit.       Allergies   Allergen Reactions    Nsaids (Non-Steroidal Anti-Inflammatory Drug) Unknown (comments)     GI upset     Family History   Problem Relation Age of Onset   Richard Cords Cancer Mother 79        breast    Breast Cancer Mother         mother was in her 62s    Hypertension Mother     Other Father         PVD    Cancer Sister         uterine, metastatic    Diabetes Brother     No Known Problems Sister     No Known Problems Sister     No Known Problems Sister     No Known Problems Sister     No Known Problems Sister     No Known Problems Brother     No Known Problems Brother     No Known Problems Brother     No Known Problems Brother     No Known Problems Maternal Grandmother     No Known Problems Maternal Grandfather     No Known Problems Paternal Grandmother     No Known Problems Paternal Grandfather      Social History     Socioeconomic History    Marital status:      Spouse name: Not on file    Number of children: Not on file    Years of education: Not on file    Highest education level: Not on file   Occupational History    Not on file   Social Needs    Financial resource strain: Not on file    Food insecurity:     Worry: Not on file     Inability: Not on file    Transportation needs:     Medical: Not on file     Non-medical: Not on file   Tobacco Use    Smoking status: Never Smoker    Smokeless tobacco: Never Used   Substance and Sexual Activity    Alcohol use: No     Alcohol/week: 0.0 standard drinks    Drug use: No     Types: Prescription, OTC    Sexual activity: Not Currently   Lifestyle    Physical activity:     Days per week: Not on file     Minutes per session: Not on file    Stress: Not on file   Relationships    Social connections:     Talks on phone: Not on file     Gets together: Not on file     Attends Restorationist service: Not on file     Active member of club or organization: Not on file     Attends meetings of clubs or organizations: Not on file     Relationship status: Not on file    Intimate partner violence:     Fear of current or ex partner: Not on file     Emotionally abused: Not on file     Physically abused: Not on file     Forced sexual activity: Not on file   Other Topics Concern    Not on file   Social History Narrative    Not on file     Review of Systems:  - Constitutional Symptoms: no fevers, chills, weight loss  - Eyes: no blurry vision or double vision  - Cardiovascular: no chest pain or palpitations  - Respiratory: no cough or shortness of breath  - Gastrointestinal: no dysphagia or abdominal pain  - Musculoskeletal: no joint pains or weakness  - Integumentary: no rashes  - Neurological: no numbness, tingling, or headaches  - Psychiatric: no depression or anxiety  - Endocrine: no heat or cold intolerance, no polyuria or polydipsia    Physical Examination:  Blood pressure 141/53, pulse (!) 52, height 5' 6\" (1.676 m), weight 188 lb 6.4 oz (85.5 kg).   - General: pleasant, no distress, good eye contact  - HEENT: no exopthalmos, no periorbital edema, no scleral/conjunctival injection, EOMI, no lid lag or stare  - Neck: supple, no thyromegaly, masses, lymph nodes, or carotid bruits, no supraclavicular or dorsocervical fat pads  - Cardiovascular: regular, normal rate, normal S1 and S2, no murmurs/rubs/gallops, 2+ dorsalis pedis pulses bilaterally  - Respiratory: clear to auscultation bilaterally  - Gastrointestinal: soft, nontender, nondistended, no masses, no hepatosplenomegaly  - Musculoskeletal: no proximal muscle weakness in upper or lower extremities  - Integumentary: no acanthosis nigricans, no rashes, no edema,   - Neurological: reflexes 2+ at biceps, no tremor  - Psychiatric: normal mood and affect    Data Reviewed:   Component      Latest Ref Rng & Units 5/20/2019 5/20/2019 5/20/2019 5/20/2019          10:08 AM 10:08 AM 10:08 AM 10:08 AM   Norepinephrine, plasma      0 - 874 pg/mL 430      Epinephrine, plasma      0 - 62 pg/mL 76 (H)      Dopamine, plasma      0 - 48 pg/mL <30      Normetanephrine, free      0 - 145 pg/mL    91   Metanephrine, free      0 - 62 pg/mL    58   T4, Total      4.5 - 12.0 ug/dL   5.8    Thyrotropin Receptor Ab, serum      0.00 - 1.75 IU/L  <0.50       Component      Latest Ref Rng & Units 5/1/2019          11:08 AM   Glucose      65 - 99 mg/dL 98   BUN      8 - 27 mg/dL 12   Creatinine      0.57 - 1.00 mg/dL 0.60   GFR est non-AA      >59 mL/min/1.73 84   GFR est AA      >59 mL/min/1.73 97   BUN/Creatinine ratio      12 - 28 20   Sodium      134 - 144 mmol/L 141   Potassium      3.5 - 5.2 mmol/L 3.8   Chloride      96 - 106 mmol/L 101   CO2      20 - 29 mmol/L 28   Calcium      8.7 - 10.3 mg/dL 9.7   Protein, total      6.0 - 8.5 g/dL 7.2   Albumin      3.5 - 4.7 g/dL 4.2   GLOBULIN, TOTAL      1.5 - 4.5 g/dL 3.0   A-G Ratio      1.2 - 2.2 1.4   Bilirubin, total      0.0 - 1.2 mg/dL 0.4   Alk.  phosphatase      39 - 117 IU/L 37 (L)   AST      0 - 40 IU/L 19   ALT (SGPT)      0 - 32 IU/L 19     EXAM: US ABD COMP      INDICATION: Fatigue perfuse sweating.     COMPARISON: None.     TECHNIQUE:   Real-time sonography of the abdomen was performed with multiple static images of  the liver, gallbladder, pancreas, spleen, kidneys and retroperitoneum obtained.     FINDINGS:  LIVER:   The liver is normal in echotexture with no mass or other focal abnormality.      LIVER VASCULATURE:   The portal vein flow is patent.     GALLBLADDER:  The gallbladder was rates multiple gallstones. Patient is not tender over the  region of the gallbladder. There is no wall thickening or fluid around the  gallbladder.      COMMON BILE DUCT:  There is no biliary duct dilatation and the common duct measures 5 mm in  diameter.      PANCREAS:  Partly obscured by bowel gas.     SPLEEN:  The spleen is normal in echotexture and size and measures 6.8 cm in length.     RIGHT KIDNEY:  The right kidney demonstrates normal echogenicity with no mass, stone or  hydronephrosis. The right kidney measures 11.3 cm in length.     LEFT KIDNEY:  The left kidney demonstrates normal echogenicity with no mass, stone or  hydronephrosis. The left kidney measures 13 cm in length.      RETROPERITONEUM:  The aorta tapers normally. The IVC is normal.  No retroperitoneal mass is identified.     IMPRESSION  IMPRESSION:   1. Cholelithiasis. There is no ductal dilatation. Patient is not tender over the  region of the gallbladder. No focal lesions are identified within the liver. Assessment/Plan:   1. Night sweats    1) Dr. Drew Howard has already correctly ruled out hyperthyroidism and pheochromocytoma and NET. Will order an IGF-1 level to rule out acromegaly and will order a 24 hour urine. If these come back normal, that would rule out most of the endocrine causes of hot flashes. She reports that she did not have post-menopausal hot flashes, she denies any recent changes in medications (though her atenolol and chlorthalidone could cause sweats). She also notes she had this before, but it resolved on its own. If we can not find an endocrine cause for her night sweats we could try her on an HS SNRI or HS clonidine, which have been shown to help with night sweats. Pt voices understanding and agreement with the plan.     RTC 3 months    Patient Instructions   Pick a day when you can be home all day to do the urine collection. On the day you start the collection you will DISCARD your first urine of the day, but collect ALL the rest of your urine for that day, including anytime you go to the bathroom that night. On the second day you DO collect your first urine of day two, and then bring the urine to [facility].      Follow-up and Dispositions    · Return in about 3 months (around 4/24/2020).          Copy sent to:  Dr. Neo Gill

## 2020-01-24 NOTE — PATIENT INSTRUCTIONS
Pick a day when you can be home all day to do the urine collection. On the day you start the collection you will DISCARD your first urine of the day, but collect ALL the rest of your urine for that day, including anytime you go to the bathroom that night.  
On the second day you DO collect your first urine of day two, and [facility]

## 2020-01-26 LAB — IGF-I SERPL-MCNC: 159 NG/ML (ref 34–172)

## 2020-02-07 DIAGNOSIS — R61 HYPERHIDROSIS: Primary | ICD-10-CM

## 2020-02-07 LAB
DOPAMINE 24H UR-MRATE: 522 UG/24 HR (ref 0–510)
DOPAMINE UR-MCNC: 326 UG/L
EPINEPH 24H UR-MRATE: 21 UG/24 HR (ref 0–20)
EPINEPH UR-MCNC: 13 UG/L
NOREPINEPH 24H UR-MRATE: 117 UG/24 HR (ref 0–135)
NOREPINEPH UR-MCNC: 73 UG/L
VMA 24H UR-MRATE: 4 MG/24 HR (ref 0–7.5)
VMA UR-MCNC: 2.5 MG/L

## 2020-02-07 RX ORDER — CLONIDINE HYDROCHLORIDE 0.1 MG/1
0.1 TABLET ORAL
Qty: 30 TAB | Refills: 3 | Status: SHIPPED | OUTPATIENT
Start: 2020-02-07 | End: 2020-06-05

## 2020-02-07 NOTE — PROGRESS NOTES
Spoke with pt regarding her lab results. They did not show any significant abnormalities and nothing that would explain her symptoms of night sweats. We discussed that I want to try her on a low dose of Clonidine at bedtime to help alleviate the night sweats. I will also give her order for labs to repeat on a day after she has had the night sweats, to see if we can catch an episodic spike in any hormone levels. Pt voices understanding and agreement with the plan.

## 2020-02-17 ENCOUNTER — HOSPITAL ENCOUNTER (OUTPATIENT)
Dept: MAMMOGRAPHY | Age: 85
Discharge: HOME | End: 2020-02-17
Attending: FAMILY MEDICINE
Payer: MEDICARE

## 2020-02-17 DIAGNOSIS — Z12.31 VISIT FOR SCREENING MAMMOGRAM: ICD-10-CM

## 2020-02-17 PROCEDURE — 77067 SCR MAMMO BI INCL CAD: CPT

## 2020-02-26 ENCOUNTER — TELEPHONE (OUTPATIENT)
Dept: ENDOCRINOLOGY | Age: 85
End: 2020-02-26

## 2020-02-26 NOTE — TELEPHONE ENCOUNTER
----- Message from Negrito Gardiner sent at 2/26/2020  4:38 PM EST -----  Regarding: Dr Mandy Woodruff Message/Vendor Calls    Caller's first and last name:      Reason for call:to see if Dr Mingo White can recommend or prescribe the medication Buspar for her night sweats her niece said that helps her a lot and she would like to know if Dr Octavio Arrington thinks she should try that also       Callback required yes/no and why:yes for reason given above      Best contact number(s):[phone]      Details to clarify the request:      Negrito Gardiner

## 2020-02-27 NOTE — TELEPHONE ENCOUNTER
I recommend she try the Clonidine that I ordered. One pill about 1/2 an hour before she goes to bed each night.

## 2020-02-27 NOTE — TELEPHONE ENCOUNTER
Patient has been advised regarding Buspar not being recommended, but OK to take Clonidine 1/2 hour prior to bedtime. Patient expressed understanding. She knows Rx has been sent to [facility].

## 2020-02-27 NOTE — TELEPHONE ENCOUNTER
----- Message from Bruno Moeller sent at 2/27/2020  3:19 PM EST -----  Regarding: /Telephone  General Message/Vendor Calls    Caller's first and last name: Bismark Vasquez      Reason for call: inquiring about the use of medication       Callback required yes/no and why: yes      Best contact number(s): [phone]      Details to clarify the request: Pt called requesting a call back in regard to the medication clonidine has helped pt niece night sweats . Pt is inquiring about the medication  for pt night sweats as well .       Bruno Moeller

## 2020-02-27 NOTE — TELEPHONE ENCOUNTER
I would not recommend the use of this medication for her. This is a medication for anxiety and I don't believe this would help her.

## 2021-03-29 ENCOUNTER — TRANSCRIBE ORDER (OUTPATIENT)
Dept: SCHEDULING | Age: 86
End: 2021-03-29

## 2021-03-29 DIAGNOSIS — M81.0 OSTEOPOROSIS: Primary | ICD-10-CM

## 2021-11-02 ENCOUNTER — HOSPITAL ENCOUNTER (OUTPATIENT)
Dept: MAMMOGRAPHY | Age: 86
Discharge: HOME | End: 2021-11-02
Attending: NURSE PRACTITIONER
Payer: MEDICARE

## 2021-11-02 DIAGNOSIS — M81.0 OSTEOPOROSIS: ICD-10-CM

## 2021-11-02 PROCEDURE — 77080 DXA BONE DENSITY AXIAL: CPT

## 2023-05-13 RX ORDER — CLONIDINE HYDROCHLORIDE 0.1 MG/1
1 TABLET ORAL NIGHTLY
COMMUNITY
Start: 2020-06-05

## 2023-05-13 RX ORDER — ATENOLOL AND CHLORTHALIDONE TABLET 50; 25 MG/1; MG/1
TABLET ORAL
COMMUNITY
Start: 2019-05-17

## 2023-05-13 RX ORDER — ESZOPICLONE 1 MG/1
TABLET, FILM COATED ORAL
COMMUNITY
Start: 2018-04-11

## 2023-05-13 RX ORDER — ASPIRIN 81 MG/1
81 TABLET ORAL DAILY
COMMUNITY

## 2023-05-13 RX ORDER — AMLODIPINE BESYLATE 2.5 MG/1
1 TABLET ORAL DAILY
COMMUNITY
Start: 2020-05-11

## 2023-08-03 ENCOUNTER — APPOINTMENT (OUTPATIENT)
Facility: HOSPITAL | Age: 88
End: 2023-08-03
Payer: MEDICARE

## 2023-08-03 ENCOUNTER — HOSPITAL ENCOUNTER (EMERGENCY)
Facility: HOSPITAL | Age: 88
Discharge: HOME | End: 2023-08-03
Attending: STUDENT IN AN ORGANIZED HEALTH CARE EDUCATION/TRAINING PROGRAM
Payer: MEDICARE

## 2023-08-03 VITALS
OXYGEN SATURATION: 99 % | SYSTOLIC BLOOD PRESSURE: 115 MMHG | HEIGHT: 68 IN | DIASTOLIC BLOOD PRESSURE: 48 MMHG | HEART RATE: 78 BPM | WEIGHT: 175.71 LBS | TEMPERATURE: 96.8 F | BODY MASS INDEX: 26.63 KG/M2 | RESPIRATION RATE: 16 BRPM

## 2023-08-03 DIAGNOSIS — G44.86 CERVICOGENIC HEADACHE: Primary | ICD-10-CM

## 2023-08-03 LAB
APPEARANCE UR: CLEAR
BACTERIA URNS QL MICRO: NEGATIVE /HPF
BILIRUB UR QL: NEGATIVE
COLOR UR: ABNORMAL
EPITH CASTS URNS QL MICRO: ABNORMAL /LPF
GLUCOSE UR STRIP.AUTO-MCNC: NEGATIVE MG/DL
HGB UR QL STRIP: NEGATIVE
KETONES UR QL STRIP.AUTO: NEGATIVE MG/DL
LEUKOCYTE ESTERASE UR QL STRIP.AUTO: NEGATIVE
MUCOUS THREADS URNS QL MICRO: ABNORMAL /LPF
NITRITE UR QL STRIP.AUTO: NEGATIVE
PH UR STRIP: 6.5 (ref 5–8)
PROT UR STRIP-MCNC: ABNORMAL MG/DL
RBC #/AREA URNS HPF: ABNORMAL /HPF (ref 0–5)
SP GR UR REFRACTOMETRY: 1.01 (ref 1–1.03)
URINE CULTURE IF INDICATED: ABNORMAL
UROBILINOGEN UR QL STRIP.AUTO: 1 EU/DL (ref 0.2–1)
WBC URNS QL MICRO: ABNORMAL /HPF (ref 0–4)

## 2023-08-03 PROCEDURE — 70450 CT HEAD/BRAIN W/O DYE: CPT

## 2023-08-03 PROCEDURE — 72125 CT NECK SPINE W/O DYE: CPT

## 2023-08-03 PROCEDURE — 81001 URINALYSIS AUTO W/SCOPE: CPT

## 2023-08-03 PROCEDURE — 99284 EMERGENCY DEPT VISIT MOD MDM: CPT

## 2023-08-03 PROCEDURE — 6370000000 HC RX 637 (ALT 250 FOR IP): Performed by: STUDENT IN AN ORGANIZED HEALTH CARE EDUCATION/TRAINING PROGRAM

## 2023-08-03 RX ORDER — ACETAMINOPHEN 500 MG
1000 TABLET ORAL
Status: COMPLETED | OUTPATIENT
Start: 2023-08-03 | End: 2023-08-03

## 2023-08-03 RX ADMIN — ACETAMINOPHEN 1000 MG: 500 TABLET ORAL at 13:11

## 2023-08-03 ASSESSMENT — PAIN SCALES - GENERAL
PAINLEVEL_OUTOF10: 2
PAINLEVEL_OUTOF10: 5

## 2023-08-03 ASSESSMENT — PAIN DESCRIPTION - LOCATION
LOCATION: NECK
LOCATION: NECK;HEAD

## 2023-08-03 ASSESSMENT — PAIN DESCRIPTION - ORIENTATION: ORIENTATION: UPPER;MID

## 2023-08-03 ASSESSMENT — PAIN DESCRIPTION - PAIN TYPE: TYPE: ACUTE PAIN

## 2023-08-03 ASSESSMENT — PAIN - FUNCTIONAL ASSESSMENT
PAIN_FUNCTIONAL_ASSESSMENT: NONE - DENIES PAIN
PAIN_FUNCTIONAL_ASSESSMENT: NONE - DENIES PAIN
PAIN_FUNCTIONAL_ASSESSMENT: 0-10

## 2023-08-03 ASSESSMENT — PAIN DESCRIPTION - DESCRIPTORS
DESCRIPTORS: DISCOMFORT
DESCRIPTORS: ACHING

## 2023-08-03 NOTE — ED NOTES
Patient sleeping in stretcher with respirations even and unlabored.      Bety Vences RN  08/03/23 2663

## 2023-08-03 NOTE — ED NOTES
Son was called 3 times, text message sent with response. Patient is D/C to go back to the facility. Call facility, gave reports to RN. [facility] contacted [facility] transport, ETA 1814. Patient is in the bed, resting.       Patience Lafleur RN  08/03/23 9074

## 2023-08-03 NOTE — ED NOTES
Patient's son reports that the patient has been more confused than normal, pacing the halls at night. Patient's son requests that we rule out a urinary tract infection.      Viola Bee RN  08/03/23 6885

## 2023-08-03 NOTE — ED NOTES
Patient left ED in no acute distress, alert with hospital to home. Patient declined to recheck her last set of VS. Reports no pain, patient was walking in the hallway.       Kaur Vick RN  08/03/23 4637

## 2023-08-03 NOTE — ED NOTES
Patient to CT via stretcher. Patient swallowed Tylenol without difficulty and remains alert with respirations even and unlabored.      Leonor Felder RN  08/03/23 9013

## 2023-08-03 NOTE — ED NOTES
Patient is taking a nap, easily to wake up, reports no pain at this time. Will continue to monitor.       Krysta Mcqueen RN  08/03/23 2838

## 2023-08-03 NOTE — ED NOTES
Patient assisted to use bedside commode and provide clean catch urine specimen. Patient then positioned for comfort in stretcher.      Inga Topete RN  08/03/23 2599

## 2023-08-03 NOTE — ED TRIAGE NOTES
PRESENTED TO THE ED [place]IA [facility] [place] FROM [address] WITH GENERALIZED WEAKNESS AND UPPER NECK PAIN, \"BASE OF SKULL\" THAT BEGAN THIS MORNING. DENIES FALL OR INJURY. HX DEMENTIA AND KEEPS ASKING REPETITIVE QUESTIONS. VAN AND FAST EXAMS NEGATIVE. GLUCOSE 156 MG/DL.

## 2023-08-22 ENCOUNTER — APPOINTMENT (OUTPATIENT)
Facility: HOSPITAL | Age: 88
End: 2023-08-22
Payer: MEDICARE

## 2023-08-22 ENCOUNTER — HOSPITAL ENCOUNTER (EMERGENCY)
Facility: HOSPITAL | Age: 88
Discharge: HOME | End: 2023-08-22
Attending: EMERGENCY MEDICINE
Payer: MEDICARE

## 2023-08-22 VITALS
OXYGEN SATURATION: 94 % | SYSTOLIC BLOOD PRESSURE: 167 MMHG | BODY MASS INDEX: 26.68 KG/M2 | RESPIRATION RATE: 31 BRPM | HEART RATE: 87 BPM | HEIGHT: 66 IN | WEIGHT: 166.01 LBS | TEMPERATURE: 98.6 F | DIASTOLIC BLOOD PRESSURE: 87 MMHG

## 2023-08-22 DIAGNOSIS — R51.9 NONINTRACTABLE HEADACHE, UNSPECIFIED CHRONICITY PATTERN, UNSPECIFIED HEADACHE TYPE: Primary | ICD-10-CM
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
## 2023-08-22 LAB
ALBUMIN SERPL-MCNC: 2.2 G/DL (ref 3.5–5)
ALBUMIN/GLOB SERPL: 0.4 (ref 1.1–2.2)
ALP SERPL-CCNC: 66 U/L (ref 45–117)
ALT SERPL-CCNC: 40 U/L (ref 12–78)
ANION GAP SERPL CALC-SCNC: 9 MMOL/L (ref 5–15)
APPEARANCE UR: CLEAR
AST SERPL-CCNC: 29 U/L (ref 15–37)
BACTERIA URNS QL MICRO: NEGATIVE /HPF
BASOPHILS # BLD: 0 K/UL (ref 0–0.1)
BASOPHILS NFR BLD: 0 % (ref 0–1)
BILIRUB SERPL-MCNC: 0.5 MG/DL (ref 0.2–1)
BILIRUB UR QL: NEGATIVE
BUN SERPL-MCNC: 7 MG/DL (ref 6–20)
BUN/CREAT SERPL: 10 (ref 12–20)
CALCIUM SERPL-MCNC: 9.5 MG/DL (ref 8.5–10.1)
CHLORIDE SERPL-SCNC: 103 MMOL/L (ref 97–108)
CK SERPL-CCNC: 30 U/L (ref 26–192)
CO2 SERPL-SCNC: 31 MMOL/L (ref 21–32)
COLOR UR: ABNORMAL
CREAT SERPL-MCNC: 0.72 MG/DL (ref 0.55–1.02)
DIFFERENTIAL METHOD BLD: ABNORMAL
EOSINOPHIL # BLD: 0 K/UL (ref 0–0.4)
EOSINOPHIL NFR BLD: 0 % (ref 0–7)
EPITH CASTS URNS QL MICRO: ABNORMAL /LPF
ERYTHROCYTE [DISTWIDTH] IN BLOOD BY AUTOMATED COUNT: 14.3 % (ref 11.5–14.5)
GLOBULIN SER CALC-MCNC: 5.9 G/DL (ref 2–4)
GLUCOSE SERPL-MCNC: 137 MG/DL (ref 65–100)
GLUCOSE UR STRIP.AUTO-MCNC: NEGATIVE MG/DL
HCT VFR BLD AUTO: 36 % (ref 35–47)
HGB BLD-MCNC: 11.3 G/DL (ref 11.5–16)
HGB UR QL STRIP: NEGATIVE
IMM GRANULOCYTES # BLD AUTO: 0.1 K/UL (ref 0–0.04)
IMM GRANULOCYTES NFR BLD AUTO: 1 % (ref 0–0.5)
KETONES UR QL STRIP.AUTO: 15 MG/DL
LEUKOCYTE ESTERASE UR QL STRIP.AUTO: NEGATIVE
LYMPHOCYTES # BLD: 1.9 K/UL (ref 0.8–3.5)
LYMPHOCYTES NFR BLD: 19 % (ref 12–49)
MAGNESIUM SERPL-MCNC: 1.9 MG/DL (ref 1.6–2.4)
MCH RBC QN AUTO: 26.5 PG (ref 26–34)
MCHC RBC AUTO-ENTMCNC: 31.4 G/DL (ref 30–36.5)
MCV RBC AUTO: 84.5 FL (ref 80–99)
MONOCYTES # BLD: 0.9 K/UL (ref 0–1)
MONOCYTES NFR BLD: 9 % (ref 5–13)
NEUTS SEG # BLD: 7 K/UL (ref 1.8–8)
NEUTS SEG NFR BLD: 71 % (ref 32–75)
NITRITE UR QL STRIP.AUTO: NEGATIVE
NRBC # BLD: 0 K/UL (ref 0–0.01)
NRBC BLD-RTO: 0 PER 100 WBC
PH UR STRIP: 7.5 (ref 5–8)
PLATELET # BLD AUTO: 421 K/UL (ref 150–400)
PMV BLD AUTO: 8.8 FL (ref 8.9–12.9)
POTASSIUM SERPL-SCNC: 3 MMOL/L (ref 3.5–5.1)
PROT SERPL-MCNC: 8.1 G/DL (ref 6.4–8.2)
PROT UR STRIP-MCNC: NEGATIVE MG/DL
RBC # BLD AUTO: 4.26 M/UL (ref 3.8–5.2)
RBC #/AREA URNS HPF: ABNORMAL /HPF (ref 0–5)
SODIUM SERPL-SCNC: 143 MMOL/L (ref 136–145)
SP GR UR REFRACTOMETRY: 1.01 (ref 1–1.03)
UROBILINOGEN UR QL STRIP.AUTO: 2 EU/DL (ref 0.2–1)
WBC # BLD AUTO: 9.9 K/UL (ref 3.6–11)
WBC URNS QL MICRO: ABNORMAL /HPF (ref 0–4)

## 2023-08-22 PROCEDURE — 73502 X-RAY EXAM HIP UNI 2-3 VIEWS: CPT

## 2023-08-22 PROCEDURE — 6360000002 HC RX W HCPCS: Performed by: EMERGENCY MEDICINE

## 2023-08-22 PROCEDURE — 72125 CT NECK SPINE W/O DYE: CPT

## 2023-08-22 PROCEDURE — 85025 COMPLETE CBC W/AUTO DIFF WBC: CPT

## 2023-08-22 PROCEDURE — 99284 EMERGENCY DEPT VISIT MOD MDM: CPT

## 2023-08-22 PROCEDURE — 36415 COLL VENOUS BLD VENIPUNCTURE: CPT

## 2023-08-22 PROCEDURE — 83735 ASSAY OF MAGNESIUM: CPT

## 2023-08-22 PROCEDURE — 70450 CT HEAD/BRAIN W/O DYE: CPT

## 2023-08-22 PROCEDURE — 72170 X-RAY EXAM OF PELVIS: CPT

## 2023-08-22 PROCEDURE — 71045 X-RAY EXAM CHEST 1 VIEW: CPT

## 2023-08-22 PROCEDURE — 81001 URINALYSIS AUTO W/SCOPE: CPT

## 2023-08-22 PROCEDURE — 82550 ASSAY OF CK (CPK): CPT

## 2023-08-22 PROCEDURE — 80053 COMPREHEN METABOLIC PANEL: CPT

## 2023-08-22 PROCEDURE — 96374 THER/PROPH/DIAG INJ IV PUSH: CPT

## 2023-08-22 RX ORDER — LORAZEPAM 2 MG/ML
0.5 INJECTION INTRAMUSCULAR ONCE
Status: COMPLETED | OUTPATIENT
Start: 2023-08-22 | End: 2023-08-22

## 2023-08-22 RX ADMIN — LORAZEPAM 0.5 MG: 2 INJECTION INTRAMUSCULAR; INTRAVENOUS at 15:33

## 2023-08-22 ASSESSMENT — PAIN - FUNCTIONAL ASSESSMENT: PAIN_FUNCTIONAL_ASSESSMENT: 0-10

## 2023-08-22 ASSESSMENT — PAIN DESCRIPTION - LOCATION: LOCATION: HEAD

## 2023-08-22 ASSESSMENT — PAIN SCALES - GENERAL: PAINLEVEL_OUTOF10: 8

## 2023-08-22 ASSESSMENT — PAIN DESCRIPTION - ORIENTATION: ORIENTATION: POSTERIOR;MID;LEFT

## 2023-08-22 NOTE — ED NOTES
1650: Attempted to call [facility] NA  1700: Attempted to call [facility] NA x2  1708: Attempted to call [facility] NA x3    1925: Discharge instructions provided. Pt verbalized understanding. Opportunity provided for questions. Pt discharged home. Report provided to [facility], patient assisted onto stretcher without incident. Patient discharged and left with [facility] back to facility.      Geri Foy RN  08/22/23 6248

## 2023-08-22 NOTE — ED PROVIDER NOTES
[facility]  [id number] [address] [facility] [address] [facility] [id number] [phone]    As needed, If symptoms worsen      DISCHARGE MEDICATIONS:  New Prescriptions    No medications on file     Controlled Substances Monitoring:     No flowsheet data found.     (Please note that portions of this note were completed with a voice recognition program.  Efforts were made to edit the dictations but occasionally words are mis-transcribed.)    Juan Tirado MD (electronically signed)  Attending Emergency Physician            Martín Bingham MD  08/22/23 5892

## 2023-08-22 NOTE — ED TRIAGE NOTES
[facility] unwitnessed, facility found patient on floor. Patient does not recall incident. Patient arrived with no clothes and does not remember how their clothes came off. Facility nurse said they are not familiar with patient that this was the first day with them.     Hx UTI in past

## 2023-08-22 NOTE — DISCHARGE INSTRUCTIONS
Thank you for allowing us to provide you with medical care today. We realize that you have many choices for your emergency care needs. We thank you for choosing [address] [facility]. Please choose us in the future for any continued health care needs. The exam and treatment you received in the Emergency Department were for an emergent problem and are not intended as complete care. It is important that you follow up with a doctor, nurse practitioner, or physician's assistant for ongoing care. If your symptoms worsen or you do not improve as expected and you are unable to reach your usual health care provider, you should return to the Emergency Department. We are available 24 hours a day. Please make an appointment with your health care provider(s) for follow up of your Emergency Department visit. Take this sheet with you when you go to your follow-up visit.

## 2023-08-23 LAB
COMMENT:: NORMAL
SPECIMEN HOLD: NORMAL

## 2023-08-26 ENCOUNTER — APPOINTMENT (OUTPATIENT)
Facility: HOSPITAL | Age: 88
End: 2023-08-26
Payer: MEDICARE

## 2023-08-26 ENCOUNTER — HOSPITAL ENCOUNTER (EMERGENCY)
Facility: HOSPITAL | Age: 88
Discharge: HOME | End: 2023-08-26
Attending: EMERGENCY MEDICINE
Payer: MEDICARE

## 2023-08-26 VITALS
SYSTOLIC BLOOD PRESSURE: 153 MMHG | RESPIRATION RATE: 16 BRPM | OXYGEN SATURATION: 97 % | DIASTOLIC BLOOD PRESSURE: 54 MMHG | HEART RATE: 74 BPM | BODY MASS INDEX: 27.4 KG/M2 | TEMPERATURE: 97.9 F | WEIGHT: 169.75 LBS

## 2023-08-26 DIAGNOSIS — H53.8 VISION BLURRING: Primary | ICD-10-CM

## 2023-08-26 PROCEDURE — 99284 EMERGENCY DEPT VISIT MOD MDM: CPT

## 2023-08-26 PROCEDURE — 70450 CT HEAD/BRAIN W/O DYE: CPT

## 2023-08-26 PROCEDURE — 6370000000 HC RX 637 (ALT 250 FOR IP): Performed by: EMERGENCY MEDICINE

## 2023-08-26 RX ORDER — TETRACAINE HYDROCHLORIDE 5 MG/ML
2 SOLUTION OPHTHALMIC ONCE
Status: COMPLETED | OUTPATIENT
Start: 2023-08-26 | End: 2023-08-26

## 2023-08-26 RX ORDER — VALACYCLOVIR HYDROCHLORIDE 1 G/1
1000 TABLET, FILM COATED ORAL 3 TIMES DAILY
Qty: 21 TABLET | Refills: 0 | Status: SHIPPED | OUTPATIENT
Start: 2023-08-26 | End: 2023-09-02

## 2023-08-26 RX ADMIN — FLUORESCEIN SODIUM 1 MG: 1 STRIP OPHTHALMIC at 16:14

## 2023-08-26 RX ADMIN — TETRACAINE HYDROCHLORIDE 2 DROP: 5 SOLUTION OPHTHALMIC at 16:14

## 2023-08-26 ASSESSMENT — ENCOUNTER SYMPTOMS
SHORTNESS OF BREATH: 0
VOMITING: 0
EYE PAIN: 0
EYE ITCHING: 0
BLURRED VISION: 1
ABDOMINAL PAIN: 0
EYE INFLAMMATION: 0
EYE DISCHARGE: 0
PERI-ORBITAL EDEMA: 0
DOUBLE VISION: 0
BACK PAIN: 0
NAUSEA: 0
CRUSTING: 0
BLIND SPOTS: 0
SORE THROAT: 0
COUGH: 0
CHEST TIGHTNESS: 0
DIARRHEA: 0
FACIAL SWELLING: 0
EYE WATERING: 0

## 2023-08-26 NOTE — ED TRIAGE NOTES
Arrives EMS from [facility]. Pt was eating and told staff she could not see and then navigated down the archibald appropriately and can track fingers. Pt has no other complaints and is repeating \"why am I here\" at bedside. Pt advanced dementia and is oriented only to self per her baseline.

## 2023-08-26 NOTE — ED NOTES
Pt d/c'd back to nursing home. Pt report called to [address] [place] [address].   [facility] arrived and transported pt back to facility     Kalia Can RN  08/26/23 8950

## 2023-08-28 ENCOUNTER — APPOINTMENT (OUTPATIENT)
Facility: HOSPITAL | Age: 88
End: 2023-08-28
Payer: MEDICARE

## 2023-08-28 ENCOUNTER — HOSPITAL ENCOUNTER (EMERGENCY)
Facility: HOSPITAL | Age: 88
Discharge: HOME | End: 2023-08-28
Attending: EMERGENCY MEDICINE
Payer: MEDICARE

## 2023-08-28 VITALS
SYSTOLIC BLOOD PRESSURE: 142 MMHG | BODY MASS INDEX: 27.15 KG/M2 | TEMPERATURE: 97.5 F | OXYGEN SATURATION: 96 % | RESPIRATION RATE: 16 BRPM | WEIGHT: 168.21 LBS | DIASTOLIC BLOOD PRESSURE: 58 MMHG | HEART RATE: 96 BPM
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
## 2023-08-28 PROCEDURE — 70450 CT HEAD/BRAIN W/O DYE: CPT

## 2023-08-28 PROCEDURE — 99284 EMERGENCY DEPT VISIT MOD MDM: CPT

## 2023-08-28 PROCEDURE — 6370000000 HC RX 637 (ALT 250 FOR IP): Performed by: EMERGENCY MEDICINE

## 2023-08-28 PROCEDURE — 73502 X-RAY EXAM HIP UNI 2-3 VIEWS: CPT

## 2023-08-28 RX ORDER — ACETAMINOPHEN 500 MG
1000 TABLET ORAL
Status: COMPLETED | OUTPATIENT
Start: 2023-08-28 | End: 2023-08-28

## 2023-08-28 RX ADMIN — ACETAMINOPHEN 1000 MG: 500 TABLET ORAL at 04:51

## 2023-08-28 ASSESSMENT — ENCOUNTER SYMPTOMS
COUGH: 0
SORE THROAT: 0
VOMITING: 0

## 2023-08-28 ASSESSMENT — PAIN SCALES - WONG BAKER: WONGBAKER_NUMERICALRESPONSE: 2

## 2023-08-28 ASSESSMENT — PAIN DESCRIPTION - DESCRIPTORS: DESCRIPTORS: SORE

## 2023-08-28 ASSESSMENT — PAIN - FUNCTIONAL ASSESSMENT: PAIN_FUNCTIONAL_ASSESSMENT: WONG-BAKER FACES

## 2023-08-28 NOTE — NURSE NAVIGATOR
Patient discharged awaiting ambulance transport to [facility]. report called to [facility] nursing staff.

## 2023-08-28 NOTE — ED TRIAGE NOTES
Triage note:patient has a history of dementia is a resident at [address] [place] [address] had ground level fall has right hip pain and left leg pain.

## 2023-08-28 NOTE — ED NOTES
Patient is discharged home. Patient is in no distress. Education given regarding follow up and medication. Son verbalizes understanding. left with [facility] ambulance transport service.        Ruddy Jacinto RN  08/28/23 [id number]

## 2023-08-28 NOTE — ED PROVIDER NOTES
[facility]  EMERGENCY DEPARTMENT ENCOUNTER      Pt Name: Susan Deleon  MRN: [id number]  [address] [facility] [place] 1935  Date of evaluation: 8/28/2023  Provider: Lisette Severino MD    CHIEF COMPLAINT       Chief Complaint   Patient presents with    Fall     Right hip pain  Left leg pain           HISTORY OF PRESENT ILLNESS   (Location/Symptom, Timing/Onset, Context/Setting, Quality, Duration, Modifying Factors, Severity)  Note limiting factors. 80-year-old female presents from nursing home via EMS after a fall. EMS reports the patient was seen here yesterday after a ground-level fall. She had a CAT scan done of her head and neck which were unremarkable. This evening she has had 2 falls after getting up from bed in the middle of the night. Unknown if she hit her head or lost consciousness. Patient complains of head pain and hip pain. No other complaints at this time. She has a history of dementia and cannot give significant history. Her past medical history according to the EMR is also significant for incontinence, arthritis, neuropathy, hypertension. The history is provided by the patient, the EMS personnel and medical records. Review of External Medical Records:     Nursing Notes were reviewed. REVIEW OF SYSTEMS    (2-9 systems for level 4, 10 or more for level 5)     Review of Systems   Constitutional:  Negative for fatigue. HENT:  Negative for sore throat. Eyes:  Negative for visual disturbance. Respiratory:  Negative for cough. Cardiovascular:  Negative for palpitations. Gastrointestinal:  Negative for vomiting. Genitourinary:  Negative for difficulty urinating. Musculoskeletal:  Negative for myalgias. Skin:  Negative for rash. Neurological:  Negative for weakness. Except as noted above the remainder of the review of systems was reviewed and negative.        PAST MEDICAL HISTORY     Past Medical History:   Diagnosis Date    Arthritis     inflammatory Sydmaricarmen Miller);